# Patient Record
Sex: MALE | Race: ASIAN | NOT HISPANIC OR LATINO | URBAN - METROPOLITAN AREA
[De-identification: names, ages, dates, MRNs, and addresses within clinical notes are randomized per-mention and may not be internally consistent; named-entity substitution may affect disease eponyms.]

---

## 2017-02-05 ENCOUNTER — INPATIENT (INPATIENT)
Facility: HOSPITAL | Age: 62
LOS: 2 days | Discharge: HOME | End: 2017-02-08
Attending: INTERNAL MEDICINE | Admitting: INTERNAL MEDICINE
Payer: COMMERCIAL

## 2017-02-05 VITALS
RESPIRATION RATE: 16 BRPM | SYSTOLIC BLOOD PRESSURE: 124 MMHG | OXYGEN SATURATION: 97 % | DIASTOLIC BLOOD PRESSURE: 80 MMHG | TEMPERATURE: 98 F | HEART RATE: 109 BPM

## 2017-02-05 DIAGNOSIS — Z41.8 ENCOUNTER FOR OTHER PROCEDURES FOR PURPOSES OTHER THAN REMEDYING HEALTH STATE: ICD-10-CM

## 2017-02-05 DIAGNOSIS — R55 SYNCOPE AND COLLAPSE: ICD-10-CM

## 2017-02-05 LAB
ALBUMIN SERPL ELPH-MCNC: 4.4 G/DL — SIGNIFICANT CHANGE UP (ref 3.3–5)
ALP SERPL-CCNC: 74 U/L — SIGNIFICANT CHANGE UP (ref 40–120)
ALT FLD-CCNC: 24 U/L — SIGNIFICANT CHANGE UP (ref 4–41)
APPEARANCE UR: CLEAR — SIGNIFICANT CHANGE UP
AST SERPL-CCNC: 27 U/L — SIGNIFICANT CHANGE UP (ref 4–40)
B PERT DNA SPEC QL NAA+PROBE: SIGNIFICANT CHANGE UP
BACTERIA # UR AUTO: SIGNIFICANT CHANGE UP
BASOPHILS # BLD AUTO: 0.03 K/UL — SIGNIFICANT CHANGE UP (ref 0–0.2)
BASOPHILS NFR BLD AUTO: 0.3 % — SIGNIFICANT CHANGE UP (ref 0–2)
BILIRUB SERPL-MCNC: 0.5 MG/DL — SIGNIFICANT CHANGE UP (ref 0.2–1.2)
BILIRUB UR-MCNC: NEGATIVE — SIGNIFICANT CHANGE UP
BLOOD UR QL VISUAL: NEGATIVE — SIGNIFICANT CHANGE UP
BUN SERPL-MCNC: 19 MG/DL — SIGNIFICANT CHANGE UP (ref 7–23)
C PNEUM DNA SPEC QL NAA+PROBE: NOT DETECTED — SIGNIFICANT CHANGE UP
CALCIUM SERPL-MCNC: 9.7 MG/DL — SIGNIFICANT CHANGE UP (ref 8.4–10.5)
CHLORIDE SERPL-SCNC: 98 MMOL/L — SIGNIFICANT CHANGE UP (ref 98–107)
CK MB BLD-MCNC: 0.8 — SIGNIFICANT CHANGE UP (ref 0–2.5)
CK MB BLD-MCNC: 2.63 NG/ML — SIGNIFICANT CHANGE UP (ref 1–6.6)
CK MB BLD-MCNC: 3.89 NG/ML — SIGNIFICANT CHANGE UP (ref 1–6.6)
CK SERPL-CCNC: 274 U/L — HIGH (ref 30–200)
CK SERPL-CCNC: 497 U/L — HIGH (ref 30–200)
CO2 SERPL-SCNC: 21 MMOL/L — LOW (ref 22–31)
COLOR SPEC: YELLOW — SIGNIFICANT CHANGE UP
CREAT SERPL-MCNC: 1.06 MG/DL — SIGNIFICANT CHANGE UP (ref 0.5–1.3)
EOSINOPHIL # BLD AUTO: 0 K/UL — SIGNIFICANT CHANGE UP (ref 0–0.5)
EOSINOPHIL NFR BLD AUTO: 0 % — SIGNIFICANT CHANGE UP (ref 0–6)
FLUAV H1 2009 PAND RNA SPEC QL NAA+PROBE: NOT DETECTED — SIGNIFICANT CHANGE UP
FLUAV H1 RNA SPEC QL NAA+PROBE: NOT DETECTED — SIGNIFICANT CHANGE UP
FLUAV H3 RNA SPEC QL NAA+PROBE: NOT DETECTED — SIGNIFICANT CHANGE UP
FLUAV SUBTYP SPEC NAA+PROBE: SIGNIFICANT CHANGE UP
FLUBV RNA SPEC QL NAA+PROBE: NOT DETECTED — SIGNIFICANT CHANGE UP
GLUCOSE SERPL-MCNC: 144 MG/DL — HIGH (ref 70–99)
GLUCOSE UR-MCNC: NEGATIVE — SIGNIFICANT CHANGE UP
HADV DNA SPEC QL NAA+PROBE: NOT DETECTED — SIGNIFICANT CHANGE UP
HBA1C BLD-MCNC: 6 % — HIGH (ref 4–5.6)
HCOV 229E RNA SPEC QL NAA+PROBE: NOT DETECTED — SIGNIFICANT CHANGE UP
HCOV HKU1 RNA SPEC QL NAA+PROBE: NOT DETECTED — SIGNIFICANT CHANGE UP
HCOV NL63 RNA SPEC QL NAA+PROBE: NOT DETECTED — SIGNIFICANT CHANGE UP
HCOV OC43 RNA SPEC QL NAA+PROBE: NOT DETECTED — SIGNIFICANT CHANGE UP
HCT VFR BLD CALC: 48.1 % — SIGNIFICANT CHANGE UP (ref 39–50)
HGB BLD-MCNC: 16.1 G/DL — SIGNIFICANT CHANGE UP (ref 13–17)
HMPV RNA SPEC QL NAA+PROBE: POSITIVE — HIGH
HPIV1 RNA SPEC QL NAA+PROBE: NOT DETECTED — SIGNIFICANT CHANGE UP
HPIV2 RNA SPEC QL NAA+PROBE: NOT DETECTED — SIGNIFICANT CHANGE UP
HPIV3 RNA SPEC QL NAA+PROBE: NOT DETECTED — SIGNIFICANT CHANGE UP
HPIV4 RNA SPEC QL NAA+PROBE: NOT DETECTED — SIGNIFICANT CHANGE UP
IMM GRANULOCYTES NFR BLD AUTO: 0.4 % — SIGNIFICANT CHANGE UP (ref 0–1.5)
KETONES UR-MCNC: NEGATIVE — SIGNIFICANT CHANGE UP
LEUKOCYTE ESTERASE UR-ACNC: NEGATIVE — SIGNIFICANT CHANGE UP
LIDOCAIN IGE QN: 29.6 U/L — SIGNIFICANT CHANGE UP (ref 7–60)
LYMPHOCYTES # BLD AUTO: 0.84 K/UL — LOW (ref 1–3.3)
LYMPHOCYTES # BLD AUTO: 7.4 % — LOW (ref 13–44)
M PNEUMO DNA SPEC QL NAA+PROBE: NOT DETECTED — SIGNIFICANT CHANGE UP
MCHC RBC-ENTMCNC: 30.6 PG — SIGNIFICANT CHANGE UP (ref 27–34)
MCHC RBC-ENTMCNC: 33.5 % — SIGNIFICANT CHANGE UP (ref 32–36)
MCV RBC AUTO: 91.3 FL — SIGNIFICANT CHANGE UP (ref 80–100)
MONOCYTES # BLD AUTO: 0.44 K/UL — SIGNIFICANT CHANGE UP (ref 0–0.9)
MONOCYTES NFR BLD AUTO: 3.9 % — SIGNIFICANT CHANGE UP (ref 2–14)
MUCOUS THREADS # UR AUTO: SIGNIFICANT CHANGE UP
NEUTROPHILS # BLD AUTO: 10.03 K/UL — HIGH (ref 1.8–7.4)
NEUTROPHILS NFR BLD AUTO: 88 % — HIGH (ref 43–77)
NITRITE UR-MCNC: NEGATIVE — SIGNIFICANT CHANGE UP
PH UR: 6.5 — SIGNIFICANT CHANGE UP (ref 4.6–8)
PLATELET # BLD AUTO: 186 K/UL — SIGNIFICANT CHANGE UP (ref 150–400)
PMV BLD: 10.2 FL — SIGNIFICANT CHANGE UP (ref 7–13)
POTASSIUM SERPL-MCNC: 4.6 MMOL/L — SIGNIFICANT CHANGE UP (ref 3.5–5.3)
POTASSIUM SERPL-SCNC: 4.6 MMOL/L — SIGNIFICANT CHANGE UP (ref 3.5–5.3)
PROT SERPL-MCNC: 8.2 G/DL — SIGNIFICANT CHANGE UP (ref 6–8.3)
PROT UR-MCNC: 20 — SIGNIFICANT CHANGE UP
RBC # BLD: 5.27 M/UL — SIGNIFICANT CHANGE UP (ref 4.2–5.8)
RBC # FLD: 13.4 % — SIGNIFICANT CHANGE UP (ref 10.3–14.5)
RBC CASTS # UR COMP ASSIST: SIGNIFICANT CHANGE UP (ref 0–?)
RSV RNA SPEC QL NAA+PROBE: NOT DETECTED — SIGNIFICANT CHANGE UP
RV+EV RNA SPEC QL NAA+PROBE: NOT DETECTED — SIGNIFICANT CHANGE UP
SODIUM SERPL-SCNC: 134 MMOL/L — LOW (ref 135–145)
SP GR SPEC: 1.02 — SIGNIFICANT CHANGE UP (ref 1–1.03)
SQUAMOUS # UR AUTO: SIGNIFICANT CHANGE UP
TROPONIN T SERPL-MCNC: < 0.06 NG/ML — SIGNIFICANT CHANGE UP (ref 0–0.06)
TROPONIN T SERPL-MCNC: < 0.06 NG/ML — SIGNIFICANT CHANGE UP (ref 0–0.06)
TSH SERPL-MCNC: 1.25 UIU/ML — SIGNIFICANT CHANGE UP (ref 0.27–4.2)
UROBILINOGEN FLD QL: NORMAL E.U. — SIGNIFICANT CHANGE UP (ref 0.1–0.2)
WBC # BLD: 11.39 K/UL — HIGH (ref 3.8–10.5)
WBC # FLD AUTO: 11.39 K/UL — HIGH (ref 3.8–10.5)
WBC UR QL: SIGNIFICANT CHANGE UP (ref 0–?)

## 2017-02-05 PROCEDURE — 70450 CT HEAD/BRAIN W/O DYE: CPT | Mod: 26

## 2017-02-05 PROCEDURE — 71020: CPT | Mod: 26

## 2017-02-05 RX ORDER — ASPIRIN/CALCIUM CARB/MAGNESIUM 324 MG
81 TABLET ORAL DAILY
Qty: 0 | Refills: 0 | Status: DISCONTINUED | OUTPATIENT
Start: 2017-02-06 | End: 2017-02-08

## 2017-02-05 RX ORDER — ACETAMINOPHEN 500 MG
975 TABLET ORAL ONCE
Qty: 0 | Refills: 0 | Status: COMPLETED | OUTPATIENT
Start: 2017-02-05 | End: 2017-02-05

## 2017-02-05 RX ORDER — ONDANSETRON 8 MG/1
4 TABLET, FILM COATED ORAL ONCE
Qty: 0 | Refills: 0 | Status: COMPLETED | OUTPATIENT
Start: 2017-02-05 | End: 2017-02-05

## 2017-02-05 RX ORDER — TRAMADOL HYDROCHLORIDE 50 MG/1
50 TABLET ORAL ONCE
Qty: 0 | Refills: 0 | Status: DISCONTINUED | OUTPATIENT
Start: 2017-02-05 | End: 2017-02-05

## 2017-02-05 RX ORDER — SODIUM CHLORIDE 9 MG/ML
2000 INJECTION INTRAMUSCULAR; INTRAVENOUS; SUBCUTANEOUS ONCE
Qty: 0 | Refills: 0 | Status: COMPLETED | OUTPATIENT
Start: 2017-02-05 | End: 2017-02-05

## 2017-02-05 RX ORDER — ASPIRIN/CALCIUM CARB/MAGNESIUM 324 MG
162 TABLET ORAL ONCE
Qty: 0 | Refills: 0 | Status: COMPLETED | OUTPATIENT
Start: 2017-02-05 | End: 2017-02-05

## 2017-02-05 RX ORDER — SODIUM CHLORIDE 9 MG/ML
1000 INJECTION INTRAMUSCULAR; INTRAVENOUS; SUBCUTANEOUS
Qty: 0 | Refills: 0 | Status: COMPLETED | OUTPATIENT
Start: 2017-02-05 | End: 2017-02-06

## 2017-02-05 RX ADMIN — TRAMADOL HYDROCHLORIDE 50 MILLIGRAM(S): 50 TABLET ORAL at 22:34

## 2017-02-05 RX ADMIN — Medication 975 MILLIGRAM(S): at 11:51

## 2017-02-05 RX ADMIN — SODIUM CHLORIDE 75 MILLILITER(S): 9 INJECTION INTRAMUSCULAR; INTRAVENOUS; SUBCUTANEOUS at 17:05

## 2017-02-05 RX ADMIN — ONDANSETRON 4 MILLIGRAM(S): 8 TABLET, FILM COATED ORAL at 11:54

## 2017-02-05 RX ADMIN — TRAMADOL HYDROCHLORIDE 50 MILLIGRAM(S): 50 TABLET ORAL at 23:00

## 2017-02-05 RX ADMIN — Medication 162 MILLIGRAM(S): at 13:33

## 2017-02-05 RX ADMIN — SODIUM CHLORIDE 2000 MILLILITER(S): 9 INJECTION INTRAMUSCULAR; INTRAVENOUS; SUBCUTANEOUS at 11:55

## 2017-02-05 NOTE — ED PROVIDER NOTE - OBJECTIVE STATEMENT
61M no pmh p/w loc. for past wk pt has had cough, rhinorrhea & self started himself on amoxicillin yesterday. today while at [place] pt had unwitnessed loc w/ fall to ground & defecated on himself. pt was lightheaded after but still took a flight back to [facility]. pt had similar episode of syncope while in plane & came directly to ed from airport. currently c/o of lightheadedness, n, R sided cp, ha. denies any f/c, abdominal pian, sob. 61M no pmh p/w loc. for past wk pt has had cough, rhinorrhea & self started himself on amoxicillin yesterday. today while at [place] pt had unwitnessed loc w/ fall to ground & defecated on himself w/ no prodromal symptoms. pt was lightheaded after but still took a flight back to [facility]. in airplane pt felt nauseas & lightheaded as walking to bathroom & had similar episode of syncope while in bathroom of airplane. pt came directly to ed from airport. currently c/o of lightheadedness, n, R sided cp, ha. denies any f/c, abdominal pian, sob.

## 2017-02-05 NOTE — ED ADULT TRIAGE NOTE - CHIEF COMPLAINT QUOTE
Pt  s/p syncope lasting 3 to 4 mins  followed by N/V and .  Denies chest pain, dizziness.  Pt stating he had fever and cough yesterday

## 2017-02-05 NOTE — H&P ADULT. - ASSESSMENT
62 yo male p/w chills, headache, rhinorrhea, abdominal discomfort, nausea, vomitting and syncopes admitted to tele for syncope, r/o ACS.

## 2017-02-05 NOTE — ED PROVIDER NOTE - ATTENDING CONTRIBUTION TO CARE
62 yo male no pmh presents with syncope with vomiting and stool incontinence now with cp // NL vitals afebrile no distress ao3 rrr s1s2 cta bl abd sntnd no cva or calf tenderness//ekg labs cxr ua and admit to tele

## 2017-02-05 NOTE — H&P ADULT. - RS GEN PE MLT RESP DETAILS PC
airway patent/no chest wall tenderness/clear to auscultation bilaterally/breath sounds equal/good air movement/respirations non-labored

## 2017-02-05 NOTE — H&P ADULT. - HISTORY OF PRESENT ILLNESS
60 yo male with NO PMHx/PSHx p/w syncopal episodes x2  yesterday. Pt was brushing teeth in the morning yesterday, felt diffuse abdominal discomfort and passed out (unwitnessed). Upon regain of LOC pt found himself on the floor with his fecal matter. Pt started to wash up, became nauseous and vomitted x1 yellow color emesis. Pt went inside the airplane to return back to [place] and passed out again on the airplane. Pt admits to chills, productive cough with white phlegm, rhinorrhea, headache and sore throat. He denies chest pain, sob, or palpitations.     In E.D. pt received 2Liters of NS, tylenol and zofran. Pt asymptomatic currently.

## 2017-02-05 NOTE — ED PROVIDER NOTE - MEDICAL DECISION MAKING DETAILS
ddx: seizure vs dehydration vs cardiogenic syncope.  will r/o acs, ich & arrythmia & rib fx.   -cth -cxr -labs -ce -ivf -zofran -analgesia -ekg -consider admission ddx: seizure vs dehydration vs cardiogenic syncope.  will r/o acs, ich & arrythmia & rib fx.   -cthead -cxr -labs -ce -ivf -zofran -analgesia -ekg -consider admission

## 2017-02-06 LAB
BUN SERPL-MCNC: 14 MG/DL — SIGNIFICANT CHANGE UP (ref 7–23)
CALCIUM SERPL-MCNC: 8.8 MG/DL — SIGNIFICANT CHANGE UP (ref 8.4–10.5)
CHLORIDE SERPL-SCNC: 100 MMOL/L — SIGNIFICANT CHANGE UP (ref 98–107)
CHOLEST SERPL-MCNC: 159 MG/DL — SIGNIFICANT CHANGE UP (ref 120–199)
CK MB BLD-MCNC: 1.97 NG/ML — SIGNIFICANT CHANGE UP (ref 1–6.6)
CK SERPL-CCNC: 497 U/L — HIGH (ref 30–200)
CO2 SERPL-SCNC: 19 MMOL/L — LOW (ref 22–31)
CREAT SERPL-MCNC: 0.97 MG/DL — SIGNIFICANT CHANGE UP (ref 0.5–1.3)
GLUCOSE SERPL-MCNC: 95 MG/DL — SIGNIFICANT CHANGE UP (ref 70–99)
HCT VFR BLD CALC: 47.5 % — SIGNIFICANT CHANGE UP (ref 39–50)
HDLC SERPL-MCNC: 36 MG/DL — SIGNIFICANT CHANGE UP (ref 35–55)
HGB BLD-MCNC: 15.3 G/DL — SIGNIFICANT CHANGE UP (ref 13–17)
LIPID PNL WITH DIRECT LDL SERPL: 104 MG/DL — SIGNIFICANT CHANGE UP
MAGNESIUM SERPL-MCNC: 2.1 MG/DL — SIGNIFICANT CHANGE UP (ref 1.6–2.6)
MCHC RBC-ENTMCNC: 29.8 PG — SIGNIFICANT CHANGE UP (ref 27–34)
MCHC RBC-ENTMCNC: 32.2 % — SIGNIFICANT CHANGE UP (ref 32–36)
MCV RBC AUTO: 92.6 FL — SIGNIFICANT CHANGE UP (ref 80–100)
PLATELET # BLD AUTO: 189 K/UL — SIGNIFICANT CHANGE UP (ref 150–400)
PMV BLD: 10.1 FL — SIGNIFICANT CHANGE UP (ref 7–13)
POTASSIUM SERPL-MCNC: 4.6 MMOL/L — SIGNIFICANT CHANGE UP (ref 3.5–5.3)
POTASSIUM SERPL-SCNC: 4.6 MMOL/L — SIGNIFICANT CHANGE UP (ref 3.5–5.3)
RBC # BLD: 5.13 M/UL — SIGNIFICANT CHANGE UP (ref 4.2–5.8)
RBC # FLD: 13.6 % — SIGNIFICANT CHANGE UP (ref 10.3–14.5)
SODIUM SERPL-SCNC: 136 MMOL/L — SIGNIFICANT CHANGE UP (ref 135–145)
TRIGL SERPL-MCNC: 91 MG/DL — SIGNIFICANT CHANGE UP (ref 10–149)
TROPONIN T SERPL-MCNC: < 0.06 NG/ML — SIGNIFICANT CHANGE UP (ref 0–0.06)
WBC # BLD: 8.91 K/UL — SIGNIFICANT CHANGE UP (ref 3.8–10.5)
WBC # FLD AUTO: 8.91 K/UL — SIGNIFICANT CHANGE UP (ref 3.8–10.5)

## 2017-02-06 RX ORDER — ACETAMINOPHEN 500 MG
650 TABLET ORAL EVERY 6 HOURS
Qty: 0 | Refills: 0 | Status: DISCONTINUED | OUTPATIENT
Start: 2017-02-06 | End: 2017-02-08

## 2017-02-06 RX ORDER — TRAMADOL HYDROCHLORIDE 50 MG/1
50 TABLET ORAL THREE TIMES A DAY
Qty: 0 | Refills: 0 | Status: DISCONTINUED | OUTPATIENT
Start: 2017-02-06 | End: 2017-02-08

## 2017-02-06 RX ORDER — ACETAMINOPHEN 500 MG
650 TABLET ORAL EVERY 6 HOURS
Qty: 0 | Refills: 0 | Status: DISCONTINUED | OUTPATIENT
Start: 2017-02-05 | End: 2017-02-08

## 2017-02-06 RX ADMIN — TRAMADOL HYDROCHLORIDE 50 MILLIGRAM(S): 50 TABLET ORAL at 21:36

## 2017-02-06 RX ADMIN — Medication 81 MILLIGRAM(S): at 11:28

## 2017-02-06 RX ADMIN — SODIUM CHLORIDE 75 MILLILITER(S): 9 INJECTION INTRAMUSCULAR; INTRAVENOUS; SUBCUTANEOUS at 17:06

## 2017-02-06 RX ADMIN — TRAMADOL HYDROCHLORIDE 50 MILLIGRAM(S): 50 TABLET ORAL at 22:30

## 2017-02-06 RX ADMIN — Medication 650 MILLIGRAM(S): at 00:22

## 2017-02-06 RX ADMIN — Medication 200 MILLIGRAM(S): at 21:36

## 2017-02-07 LAB
BASOPHILS # BLD AUTO: 0.06 K/UL — SIGNIFICANT CHANGE UP (ref 0–0.2)
BASOPHILS NFR BLD AUTO: 0.5 % — SIGNIFICANT CHANGE UP (ref 0–2)
BUN SERPL-MCNC: 17 MG/DL — SIGNIFICANT CHANGE UP (ref 7–23)
CALCIUM SERPL-MCNC: 8.2 MG/DL — LOW (ref 8.4–10.5)
CHLORIDE SERPL-SCNC: 101 MMOL/L — SIGNIFICANT CHANGE UP (ref 98–107)
CO2 SERPL-SCNC: 20 MMOL/L — LOW (ref 22–31)
CREAT SERPL-MCNC: 0.84 MG/DL — SIGNIFICANT CHANGE UP (ref 0.5–1.3)
EOSINOPHIL # BLD AUTO: 0.08 K/UL — SIGNIFICANT CHANGE UP (ref 0–0.5)
EOSINOPHIL NFR BLD AUTO: 0.7 % — SIGNIFICANT CHANGE UP (ref 0–6)
GLUCOSE SERPL-MCNC: 129 MG/DL — HIGH (ref 70–99)
HCT VFR BLD CALC: 42.2 % — SIGNIFICANT CHANGE UP (ref 39–50)
HGB BLD-MCNC: 13.7 G/DL — SIGNIFICANT CHANGE UP (ref 13–17)
IMM GRANULOCYTES NFR BLD AUTO: 0.1 % — SIGNIFICANT CHANGE UP (ref 0–1.5)
LYMPHOCYTES # BLD AUTO: 1.37 K/UL — SIGNIFICANT CHANGE UP (ref 1–3.3)
LYMPHOCYTES # BLD AUTO: 12.5 % — LOW (ref 13–44)
MAGNESIUM SERPL-MCNC: 2 MG/DL — SIGNIFICANT CHANGE UP (ref 1.6–2.6)
MANUAL SMEAR VERIFICATION: SIGNIFICANT CHANGE UP
MCHC RBC-ENTMCNC: 29.8 PG — SIGNIFICANT CHANGE UP (ref 27–34)
MCHC RBC-ENTMCNC: 32.5 % — SIGNIFICANT CHANGE UP (ref 32–36)
MCV RBC AUTO: 91.7 FL — SIGNIFICANT CHANGE UP (ref 80–100)
MONOCYTES # BLD AUTO: 0.78 K/UL — SIGNIFICANT CHANGE UP (ref 0–0.9)
MONOCYTES NFR BLD AUTO: 7.1 % — SIGNIFICANT CHANGE UP (ref 2–14)
MORPHOLOGY BLD-IMP: SIGNIFICANT CHANGE UP
NEUTROPHILS # BLD AUTO: 8.68 K/UL — HIGH (ref 1.8–7.4)
NEUTROPHILS NFR BLD AUTO: 79.1 % — HIGH (ref 43–77)
PHOSPHATE SERPL-MCNC: 2.7 MG/DL — SIGNIFICANT CHANGE UP (ref 2.5–4.5)
PLATELET # BLD AUTO: 194 K/UL — SIGNIFICANT CHANGE UP (ref 150–400)
PLATELET COUNT - ESTIMATE: NORMAL — SIGNIFICANT CHANGE UP
PMV BLD: 10.3 FL — SIGNIFICANT CHANGE UP (ref 7–13)
POTASSIUM SERPL-MCNC: 4.2 MMOL/L — SIGNIFICANT CHANGE UP (ref 3.5–5.3)
POTASSIUM SERPL-SCNC: 4.2 MMOL/L — SIGNIFICANT CHANGE UP (ref 3.5–5.3)
RBC # BLD: 4.6 M/UL — SIGNIFICANT CHANGE UP (ref 4.2–5.8)
RBC # FLD: 13.6 % — SIGNIFICANT CHANGE UP (ref 10.3–14.5)
SODIUM SERPL-SCNC: 137 MMOL/L — SIGNIFICANT CHANGE UP (ref 135–145)
WBC # BLD: 10.98 K/UL — HIGH (ref 3.8–10.5)
WBC # FLD AUTO: 10.98 K/UL — HIGH (ref 3.8–10.5)

## 2017-02-07 PROCEDURE — 93306 TTE W/DOPPLER COMPLETE: CPT | Mod: 26

## 2017-02-07 PROCEDURE — 71100 X-RAY EXAM RIBS UNI 2 VIEWS: CPT | Mod: 26,LT

## 2017-02-07 RX ORDER — SODIUM CHLORIDE 9 MG/ML
500 INJECTION INTRAMUSCULAR; INTRAVENOUS; SUBCUTANEOUS ONCE
Qty: 0 | Refills: 0 | Status: COMPLETED | OUTPATIENT
Start: 2017-02-07 | End: 2017-02-07

## 2017-02-07 RX ADMIN — Medication 650 MILLIGRAM(S): at 23:47

## 2017-02-07 RX ADMIN — Medication 81 MILLIGRAM(S): at 12:48

## 2017-02-07 RX ADMIN — SODIUM CHLORIDE 1000 MILLILITER(S): 9 INJECTION INTRAMUSCULAR; INTRAVENOUS; SUBCUTANEOUS at 06:27

## 2017-02-08 ENCOUNTER — TRANSCRIPTION ENCOUNTER (OUTPATIENT)
Age: 62
End: 2017-02-08

## 2017-02-08 VITALS
HEART RATE: 80 BPM | TEMPERATURE: 98 F | RESPIRATION RATE: 18 BRPM | SYSTOLIC BLOOD PRESSURE: 132 MMHG | DIASTOLIC BLOOD PRESSURE: 63 MMHG | OXYGEN SATURATION: 98 %

## 2017-02-08 LAB
BASOPHILS # BLD AUTO: 0.08 K/UL — SIGNIFICANT CHANGE UP (ref 0–0.2)
BASOPHILS NFR BLD AUTO: 0.9 % — SIGNIFICANT CHANGE UP (ref 0–2)
BUN SERPL-MCNC: 14 MG/DL — SIGNIFICANT CHANGE UP (ref 7–23)
CALCIUM SERPL-MCNC: 9.2 MG/DL — SIGNIFICANT CHANGE UP (ref 8.4–10.5)
CHLORIDE SERPL-SCNC: 100 MMOL/L — SIGNIFICANT CHANGE UP (ref 98–107)
CO2 SERPL-SCNC: 21 MMOL/L — LOW (ref 22–31)
CREAT SERPL-MCNC: 0.76 MG/DL — SIGNIFICANT CHANGE UP (ref 0.5–1.3)
EOSINOPHIL # BLD AUTO: 0.25 K/UL — SIGNIFICANT CHANGE UP (ref 0–0.5)
EOSINOPHIL NFR BLD AUTO: 2.8 % — SIGNIFICANT CHANGE UP (ref 0–6)
GLUCOSE SERPL-MCNC: 100 MG/DL — HIGH (ref 70–99)
HCT VFR BLD CALC: 46.1 % — SIGNIFICANT CHANGE UP (ref 39–50)
HGB BLD-MCNC: 15.3 G/DL — SIGNIFICANT CHANGE UP (ref 13–17)
IMM GRANULOCYTES NFR BLD AUTO: 0.1 % — SIGNIFICANT CHANGE UP (ref 0–1.5)
LYMPHOCYTES # BLD AUTO: 2.88 K/UL — SIGNIFICANT CHANGE UP (ref 1–3.3)
LYMPHOCYTES # BLD AUTO: 32.4 % — SIGNIFICANT CHANGE UP (ref 13–44)
MAGNESIUM SERPL-MCNC: 2.4 MG/DL — SIGNIFICANT CHANGE UP (ref 1.6–2.6)
MCHC RBC-ENTMCNC: 30 PG — SIGNIFICANT CHANGE UP (ref 27–34)
MCHC RBC-ENTMCNC: 33.2 % — SIGNIFICANT CHANGE UP (ref 32–36)
MCV RBC AUTO: 90.4 FL — SIGNIFICANT CHANGE UP (ref 80–100)
MONOCYTES # BLD AUTO: 0.84 K/UL — SIGNIFICANT CHANGE UP (ref 0–0.9)
MONOCYTES NFR BLD AUTO: 9.4 % — SIGNIFICANT CHANGE UP (ref 2–14)
NEUTROPHILS # BLD AUTO: 4.84 K/UL — SIGNIFICANT CHANGE UP (ref 1.8–7.4)
NEUTROPHILS NFR BLD AUTO: 54.4 % — SIGNIFICANT CHANGE UP (ref 43–77)
PLATELET # BLD AUTO: 213 K/UL — SIGNIFICANT CHANGE UP (ref 150–400)
PMV BLD: 10.2 FL — SIGNIFICANT CHANGE UP (ref 7–13)
POTASSIUM SERPL-MCNC: 4.2 MMOL/L — SIGNIFICANT CHANGE UP (ref 3.5–5.3)
POTASSIUM SERPL-SCNC: 4.2 MMOL/L — SIGNIFICANT CHANGE UP (ref 3.5–5.3)
RBC # BLD: 5.1 M/UL — SIGNIFICANT CHANGE UP (ref 4.2–5.8)
RBC # FLD: 13.3 % — SIGNIFICANT CHANGE UP (ref 10.3–14.5)
SODIUM SERPL-SCNC: 136 MMOL/L — SIGNIFICANT CHANGE UP (ref 135–145)
WBC # BLD: 8.9 K/UL — SIGNIFICANT CHANGE UP (ref 3.8–10.5)
WBC # FLD AUTO: 8.9 K/UL — SIGNIFICANT CHANGE UP (ref 3.8–10.5)

## 2017-02-08 RX ORDER — ASPIRIN/CALCIUM CARB/MAGNESIUM 324 MG
1 TABLET ORAL
Qty: 0 | Refills: 0 | COMMUNITY
Start: 2017-02-08

## 2017-02-08 RX ORDER — ACETAMINOPHEN 500 MG
2 TABLET ORAL
Qty: 0 | Refills: 0 | COMMUNITY
Start: 2017-02-08

## 2017-02-08 RX ADMIN — Medication 650 MILLIGRAM(S): at 00:36

## 2017-02-08 RX ADMIN — Medication 81 MILLIGRAM(S): at 13:07

## 2017-02-08 NOTE — DISCHARGE NOTE ADULT - CARE PROVIDER_API CALL
Reva Proctor), Cardiovascular Disease; Internal Medicine  [facility] [address] [place] [address]  Phone: [phone]  Fax: [phone] Rocio Belle), Cardiovascular Disease; Internal Medicine  [address] [place] [address]  Phone: [phone]  Fax: [phone]

## 2017-02-08 NOTE — DISCHARGE NOTE ADULT - CARE PLAN
Principal Discharge DX:	Syncope and collapse  Goal:	Stay well hydrated. Follow up as directed.  Instructions for follow-up, activity and diet:	Follow up with your primary care physician for further monitoring in 1-2 weeks. Please call to arrange appointment. Follow up with your cardiologist for an event monitor as outpatient and possible tilt table test.  Secondary Diagnosis:	Orthostatic hypotension  Goal:	Stay well hydrated to prevent recurrent drop in blood pressure upon standing. Arise very slowly from lying to sitting and sitting tp standing.  Instructions for follow-up, activity and diet:	Follow up with your primary care physician for further monitoring in 1-2 weeks. Please call to arrange appointment.  Secondary Diagnosis:	Viral infection  Goal:	Continue supportive care. Stay well hydrated.  Instructions for follow-up, activity and diet:	Follow up with your primary care physician for further monitoring in 1-2 weeks. Please call to arrange appointment. Principal Discharge DX:	Syncope and collapse  Goal:	Stay well hydrated. Follow up as directed.  Instructions for follow-up, activity and diet:	Follow up with your primary care physician for further monitoring in 1-2 weeks. Please call to arrange appointment. Follow up with your cardiologist  Dr. Proctor on 2/17 @ 2:30pm for an event monitor as outpatient and possible tilt table test.  Secondary Diagnosis:	Orthostatic hypotension  Goal:	Stay well hydrated to prevent recurrent drop in blood pressure upon standing. Arise very slowly from lying to sitting and sitting tp standing.  Instructions for follow-up, activity and diet:	Follow up with your primary care physician for further monitoring in 1-2 weeks. Please call to arrange appointment.  Secondary Diagnosis:	Viral infection  Goal:	Continue supportive care. Stay well hydrated.  Instructions for follow-up, activity and diet:	Follow up with your primary care physician for further monitoring in 1-2 weeks. Please call to arrange appointment. Principal Discharge DX:	Syncope and collapse  Goal:	Stay well hydrated. Follow up as directed.  Instructions for follow-up, activity and diet:	Follow up with your primary care physician for further monitoring in 1-2 weeks. Please call to arrange appointment. Follow up with your cardiologist  Dr. Garnett for an event monitor as outpatient and possible tilt table test.  Secondary Diagnosis:	Orthostatic hypotension  Goal:	Stay well hydrated to prevent recurrent drop in blood pressure upon standing. Arise very slowly from lying to sitting and sitting tp standing.  Instructions for follow-up, activity and diet:	Follow up with your primary care physician for further monitoring in 1-2 weeks. Please call to arrange appointment.  Secondary Diagnosis:	Viral infection  Goal:	Continue supportive care. Stay well hydrated.  Instructions for follow-up, activity and diet:	Follow up with your primary care physician for further monitoring in 1-2 weeks. Please call to arrange appointment.

## 2017-02-08 NOTE — DISCHARGE NOTE ADULT - HOSPITAL COURSE
60 y/o male with no significant PMHx presents to ED with chills, headache, rhinorrhea, abdominal discomfort, nausea, vomiting and syncope, admitted to tele for syncope.    + Syncope- cards (Barber) and EP (Marko) following, pending echo, likely vagal  + Orthostatic positive- S/P 2L NS in ED, now on NS at 75 cc/hr, repeat negative  + hMPV positive- on contact isolations, supportive care, Tylenol prn    On admission: EKG: Sinus tachycardia at 104 bpm. ACS ruled out by negative cardiac enzymes. CT head: No intracranial hemorrhage, mass effect or CT evidence of acute territorial infarct. If the patient has new and persistent symptoms, consider short interval follow-up head CT or brain MRI follow-up. WBC: 11.39. Na: 134. RVP: +hMPV positive. HgbA1C 6%. CXR: clear lungs.    Cardiology, Dr. Blandon: Echo pending. TSH pending. Follow up primary team for workup of abdominal pain. Tele. EP consult. EP consult, Dr. Zhu: Recurrent unexplained syncope likely vagal. Get echo. If echo normal, recommend outpatient event monitor. Workup for abdominal pain as per primary team. Tilt table test after echo prior to discharge to confirm my suspicion of vagal syncope. L Rib XR: No acute appearing displaced rib fractures or other gross rib pathology. Intact remaining imaged osseous structures. Clear visualized lungs. No pleural effusion and no pneumothorax. Pt found to be orthostatic positive likely due to dehydration in setting of hMPV. Pt was hydrated with normal saline and orthostasis improved.     Echocardiogram displayed EF 66% Mitral annular calcification, otherwise normal mitral valve. Minimal mitral regurgitation. Normal left ventricular internal dimensions and wall thicknesses. Normal left ventricular systolic function. No segmental wall motion abnormalities. Normal right ventricular size and function.     EP: if echo and orthostatics normal recommend tilt table test prior to discharge to confirm suspected diagnosed of vagal syncope. EP follow up on 2/8, recommended outpatient event monitor and tilt table test once he's off isolation. Per Dr. Zhu, can be done as outpt once off contact isolation.     Pt was cleared by cardiology and EP for discharge to home. Case discussed with Dr. Rawls, Pt medically cleared for discharge to home with follow up noted above.

## 2017-02-08 NOTE — DISCHARGE NOTE ADULT - PLAN OF CARE
Stay well hydrated to prevent recurrent drop in blood pressure upon standing. Arise very slowly from lying to sitting and sitting tp standing. Follow up with your primary care physician for further monitoring in 1-2 weeks. Please call to arrange appointment. Follow up with your cardiologist for an event monitor as outpatient and possible tilt table test. Follow up with your primary care physician for further monitoring in 1-2 weeks. Please call to arrange appointment. Continue supportive care. Stay well hydrated. Stay well hydrated. Follow up as directed. Follow up with your primary care physician for further monitoring in 1-2 weeks. Please call to arrange appointment. Follow up with your cardiologist  Dr. Proctor on 2/17 @ 2:30pm for an event monitor as outpatient and possible tilt table test. Follow up with your primary care physician for further monitoring in 1-2 weeks. Please call to arrange appointment. Follow up with your cardiologist  Dr. Garnett for an event monitor as outpatient and possible tilt table test.

## 2017-02-08 NOTE — DISCHARGE NOTE ADULT - MEDICATION SUMMARY - MEDICATIONS TO TAKE
I will START or STAY ON the medications listed below when I get home from the hospital:    acetaminophen 325 mg oral tablet  -- 2 tab(s) by mouth every 6 hours, As needed, for Pain  -- Indication: For Pain control    aspirin 81 mg oral delayed release tablet  -- 1 tab(s) by mouth once a day  -- Indication: For Heart Health    guaiFENesin 100 mg/5 mL oral liquid  -- 10 milliliter(s) by mouth every 6 hours, As needed, Cough  -- Indication: For Viral infection

## 2017-02-16 ENCOUNTER — TRANSCRIPTION ENCOUNTER (OUTPATIENT)
Age: 62
End: 2017-02-16

## 2018-03-08 ENCOUNTER — EMERGENCY (EMERGENCY)
Facility: HOSPITAL | Age: 63
LOS: 1 days | Discharge: HOME | End: 2018-03-08
Attending: EMERGENCY MEDICINE | Admitting: EMERGENCY MEDICINE
Payer: COMMERCIAL

## 2018-03-08 VITALS
HEART RATE: 78 BPM | SYSTOLIC BLOOD PRESSURE: 154 MMHG | TEMPERATURE: 98 F | RESPIRATION RATE: 16 BRPM | DIASTOLIC BLOOD PRESSURE: 86 MMHG | OXYGEN SATURATION: 99 %

## 2018-03-08 VITALS
HEIGHT: 64 IN | RESPIRATION RATE: 16 BRPM | DIASTOLIC BLOOD PRESSURE: 89 MMHG | HEART RATE: 71 BPM | SYSTOLIC BLOOD PRESSURE: 146 MMHG | TEMPERATURE: 98 F | OXYGEN SATURATION: 98 % | WEIGHT: 110.23 LBS

## 2018-03-08 PROCEDURE — 99283 EMERGENCY DEPT VISIT LOW MDM: CPT

## 2018-03-08 RX ORDER — DIPHENHYDRAMINE HCL 50 MG
25 CAPSULE ORAL ONCE
Qty: 0 | Refills: 0 | Status: COMPLETED | OUTPATIENT
Start: 2018-03-08 | End: 2018-03-08

## 2018-03-08 RX ADMIN — Medication 25 MILLIGRAM(S): at 22:40

## 2018-03-08 RX ADMIN — Medication 60 MILLIGRAM(S): at 22:40

## 2018-03-08 NOTE — ED ADULT TRIAGE NOTE - CHIEF COMPLAINT QUOTE
" I have rash on the right side cheek  since yesterday . Tonight I also have the rash on my (Right) arm "

## 2018-03-08 NOTE — ED PROVIDER NOTE - OBJECTIVE STATEMENT
63 y/o M pt w/ no significant PMHx presents to the ED c/o rash since yesterday. Pt states that he noticed a rash develop on his R cheek yesterday, went to work like normal, a couple hours after he noticed the redness it started to become pruritic. States that today he noticed that the rash spread to his R axillary region. States he works at a [employer] and may have been exposed to something. Pt denies n/v, fever, cough, sob or any other complaints at this time.

## 2018-03-08 NOTE — ED PROVIDER NOTE - MEDICAL DECISION MAKING DETAILS
pt with hives to the right side of his face and arm with possible contact exposure at work.  benadryl/prednisone.  Otherwise hemodynamically stable. no signs of anaphylaxis

## 2020-05-13 ENCOUNTER — TRANSCRIPTION ENCOUNTER (OUTPATIENT)
Age: 65
End: 2020-05-13

## 2020-09-08 ENCOUNTER — TRANSCRIPTION ENCOUNTER (OUTPATIENT)
Age: 65
End: 2020-09-08

## 2021-02-11 ENCOUNTER — INPATIENT (INPATIENT)
Facility: HOSPITAL | Age: 66
LOS: 1 days | Discharge: HOME | End: 2021-02-13
Attending: SURGERY | Admitting: SURGERY
Payer: COMMERCIAL

## 2021-02-11 ENCOUNTER — EMERGENCY (EMERGENCY)
Facility: HOSPITAL | Age: 66
LOS: 1 days | Discharge: ANOTHER HOSPITAL | End: 2021-02-11
Attending: STUDENT IN AN ORGANIZED HEALTH CARE EDUCATION/TRAINING PROGRAM | Admitting: STUDENT IN AN ORGANIZED HEALTH CARE EDUCATION/TRAINING PROGRAM
Payer: COMMERCIAL

## 2021-02-11 VITALS
HEART RATE: 87 BPM | RESPIRATION RATE: 18 BRPM | TEMPERATURE: 98 F | DIASTOLIC BLOOD PRESSURE: 81 MMHG | OXYGEN SATURATION: 100 % | SYSTOLIC BLOOD PRESSURE: 144 MMHG

## 2021-02-11 VITALS
SYSTOLIC BLOOD PRESSURE: 150 MMHG | RESPIRATION RATE: 18 BRPM | HEART RATE: 88 BPM | OXYGEN SATURATION: 97 % | DIASTOLIC BLOOD PRESSURE: 94 MMHG

## 2021-02-11 VITALS
HEART RATE: 70 BPM | RESPIRATION RATE: 20 BRPM | DIASTOLIC BLOOD PRESSURE: 72 MMHG | TEMPERATURE: 98 F | HEIGHT: 64 IN | WEIGHT: 132.06 LBS | OXYGEN SATURATION: 98 % | SYSTOLIC BLOOD PRESSURE: 130 MMHG

## 2021-02-11 DIAGNOSIS — K86.9 DISEASE OF PANCREAS, UNSPECIFIED: ICD-10-CM

## 2021-02-11 LAB
ALBUMIN SERPL ELPH-MCNC: 3.5 G/DL — SIGNIFICANT CHANGE UP (ref 3.3–5)
ALP SERPL-CCNC: 71 U/L — SIGNIFICANT CHANGE UP (ref 30–120)
ALT FLD-CCNC: 31 U/L DA — SIGNIFICANT CHANGE UP (ref 10–60)
AMYLASE P1 CFR SERPL: 35 U/L — SIGNIFICANT CHANGE UP (ref 25–125)
ANION GAP SERPL CALC-SCNC: 12 MMOL/L — SIGNIFICANT CHANGE UP (ref 5–17)
AST SERPL-CCNC: 27 U/L — SIGNIFICANT CHANGE UP (ref 10–40)
BASOPHILS # BLD AUTO: 0.06 K/UL — SIGNIFICANT CHANGE UP (ref 0–0.2)
BASOPHILS NFR BLD AUTO: 0.6 % — SIGNIFICANT CHANGE UP (ref 0–2)
BILIRUB SERPL-MCNC: 0.3 MG/DL — SIGNIFICANT CHANGE UP (ref 0.2–1.2)
BUN SERPL-MCNC: 23 MG/DL — SIGNIFICANT CHANGE UP (ref 7–23)
CALCIUM SERPL-MCNC: 9.3 MG/DL — SIGNIFICANT CHANGE UP (ref 8.4–10.5)
CHLORIDE SERPL-SCNC: 100 MMOL/L — SIGNIFICANT CHANGE UP (ref 96–108)
CK MB BLD-MCNC: 1.2 % — SIGNIFICANT CHANGE UP (ref 0–3.5)
CK MB CFR SERPL CALC: 2.5 NG/ML — SIGNIFICANT CHANGE UP (ref 0–3.6)
CK SERPL-CCNC: 213 U/L — SIGNIFICANT CHANGE UP (ref 39–308)
CO2 SERPL-SCNC: 23 MMOL/L — SIGNIFICANT CHANGE UP (ref 22–31)
CREAT SERPL-MCNC: 0.94 MG/DL — SIGNIFICANT CHANGE UP (ref 0.5–1.3)
EOSINOPHIL # BLD AUTO: 0.22 K/UL — SIGNIFICANT CHANGE UP (ref 0–0.5)
EOSINOPHIL NFR BLD AUTO: 2.2 % — SIGNIFICANT CHANGE UP (ref 0–6)
GLUCOSE BLDC GLUCOMTR-MCNC: 208 MG/DL — HIGH (ref 70–99)
GLUCOSE BLDC GLUCOMTR-MCNC: 75 MG/DL — SIGNIFICANT CHANGE UP (ref 70–99)
GLUCOSE SERPL-MCNC: 151 MG/DL — HIGH (ref 70–99)
HCT VFR BLD CALC: 40 % — SIGNIFICANT CHANGE UP (ref 39–50)
HGB BLD-MCNC: 12.7 G/DL — LOW (ref 13–17)
IMM GRANULOCYTES NFR BLD AUTO: 0.2 % — SIGNIFICANT CHANGE UP (ref 0–1.5)
LIDOCAIN IGE QN: 39 U/L — LOW (ref 73–393)
LYMPHOCYTES # BLD AUTO: 3.52 K/UL — HIGH (ref 1–3.3)
LYMPHOCYTES # BLD AUTO: 35.8 % — SIGNIFICANT CHANGE UP (ref 13–44)
MCHC RBC-ENTMCNC: 27.9 PG — SIGNIFICANT CHANGE UP (ref 27–34)
MCHC RBC-ENTMCNC: 31.8 GM/DL — LOW (ref 32–36)
MCV RBC AUTO: 87.7 FL — SIGNIFICANT CHANGE UP (ref 80–100)
MONOCYTES # BLD AUTO: 0.76 K/UL — SIGNIFICANT CHANGE UP (ref 0–0.9)
MONOCYTES NFR BLD AUTO: 7.7 % — SIGNIFICANT CHANGE UP (ref 2–14)
NEUTROPHILS # BLD AUTO: 5.25 K/UL — SIGNIFICANT CHANGE UP (ref 1.8–7.4)
NEUTROPHILS NFR BLD AUTO: 53.5 % — SIGNIFICANT CHANGE UP (ref 43–77)
NRBC # BLD: 0 /100 WBCS — SIGNIFICANT CHANGE UP (ref 0–0)
PLATELET # BLD AUTO: 249 K/UL — SIGNIFICANT CHANGE UP (ref 150–400)
POTASSIUM SERPL-MCNC: 4 MMOL/L — SIGNIFICANT CHANGE UP (ref 3.5–5.3)
POTASSIUM SERPL-SCNC: 4 MMOL/L — SIGNIFICANT CHANGE UP (ref 3.5–5.3)
PROT SERPL-MCNC: 6.9 G/DL — SIGNIFICANT CHANGE UP (ref 6–8.3)
RBC # BLD: 4.56 M/UL — SIGNIFICANT CHANGE UP (ref 4.2–5.8)
RBC # FLD: 13.2 % — SIGNIFICANT CHANGE UP (ref 10.3–14.5)
SARS-COV-2 RNA SPEC QL NAA+PROBE: SIGNIFICANT CHANGE UP
SODIUM SERPL-SCNC: 135 MMOL/L — SIGNIFICANT CHANGE UP (ref 135–145)
TROPONIN I SERPL-MCNC: 0 NG/ML — LOW (ref 0.02–0.06)
WBC # BLD: 9.83 K/UL — SIGNIFICANT CHANGE UP (ref 3.8–10.5)
WBC # FLD AUTO: 9.83 K/UL — SIGNIFICANT CHANGE UP (ref 3.8–10.5)

## 2021-02-11 PROCEDURE — 82150 ASSAY OF AMYLASE: CPT

## 2021-02-11 PROCEDURE — 99285 EMERGENCY DEPT VISIT HI MDM: CPT

## 2021-02-11 PROCEDURE — 82550 ASSAY OF CK (CPK): CPT

## 2021-02-11 PROCEDURE — 71045 X-RAY EXAM CHEST 1 VIEW: CPT | Mod: 26

## 2021-02-11 PROCEDURE — 93010 ELECTROCARDIOGRAM REPORT: CPT

## 2021-02-11 PROCEDURE — 36415 COLL VENOUS BLD VENIPUNCTURE: CPT

## 2021-02-11 PROCEDURE — 83690 ASSAY OF LIPASE: CPT

## 2021-02-11 PROCEDURE — U0005: CPT

## 2021-02-11 PROCEDURE — 99283 EMERGENCY DEPT VISIT LOW MDM: CPT

## 2021-02-11 PROCEDURE — 74177 CT ABD & PELVIS W/CONTRAST: CPT | Mod: 26,MH

## 2021-02-11 PROCEDURE — 71045 X-RAY EXAM CHEST 1 VIEW: CPT

## 2021-02-11 PROCEDURE — 99285 EMERGENCY DEPT VISIT HI MDM: CPT | Mod: 25

## 2021-02-11 PROCEDURE — 93005 ELECTROCARDIOGRAM TRACING: CPT

## 2021-02-11 PROCEDURE — 96360 HYDRATION IV INFUSION INIT: CPT

## 2021-02-11 PROCEDURE — 84484 ASSAY OF TROPONIN QUANT: CPT

## 2021-02-11 PROCEDURE — 74177 CT ABD & PELVIS W/CONTRAST: CPT

## 2021-02-11 PROCEDURE — 82553 CREATINE MB FRACTION: CPT

## 2021-02-11 PROCEDURE — U0003: CPT

## 2021-02-11 PROCEDURE — 80053 COMPREHEN METABOLIC PANEL: CPT

## 2021-02-11 PROCEDURE — 85025 COMPLETE CBC W/AUTO DIFF WBC: CPT

## 2021-02-11 RX ORDER — SODIUM CHLORIDE 9 MG/ML
1000 INJECTION INTRAMUSCULAR; INTRAVENOUS; SUBCUTANEOUS ONCE
Refills: 0 | Status: COMPLETED | OUTPATIENT
Start: 2021-02-11 | End: 2021-02-11

## 2021-02-11 RX ORDER — ACETAMINOPHEN 500 MG
1000 TABLET ORAL EVERY 6 HOURS
Refills: 0 | Status: DISCONTINUED | OUTPATIENT
Start: 2021-02-11 | End: 2021-02-13

## 2021-02-11 RX ORDER — INSULIN LISPRO 100/ML
VIAL (ML) SUBCUTANEOUS EVERY 6 HOURS
Refills: 0 | Status: DISCONTINUED | OUTPATIENT
Start: 2021-02-11 | End: 2021-02-11

## 2021-02-11 RX ORDER — GLUCAGON INJECTION, SOLUTION 0.5 MG/.1ML
1 INJECTION, SOLUTION SUBCUTANEOUS ONCE
Refills: 0 | Status: DISCONTINUED | OUTPATIENT
Start: 2021-02-11 | End: 2021-02-12

## 2021-02-11 RX ORDER — SODIUM CHLORIDE 9 MG/ML
1000 INJECTION, SOLUTION INTRAVENOUS
Refills: 0 | Status: DISCONTINUED | OUTPATIENT
Start: 2021-02-11 | End: 2021-02-12

## 2021-02-11 RX ORDER — DEXTROSE 50 % IN WATER 50 %
15 SYRINGE (ML) INTRAVENOUS ONCE
Refills: 0 | Status: DISCONTINUED | OUTPATIENT
Start: 2021-02-11 | End: 2021-02-12

## 2021-02-11 RX ORDER — LANOLIN ALCOHOL/MO/W.PET/CERES
3 CREAM (GRAM) TOPICAL AT BEDTIME
Refills: 0 | Status: DISCONTINUED | OUTPATIENT
Start: 2021-02-11 | End: 2021-02-13

## 2021-02-11 RX ORDER — INSULIN LISPRO 100/ML
VIAL (ML) SUBCUTANEOUS
Refills: 0 | Status: DISCONTINUED | OUTPATIENT
Start: 2021-02-11 | End: 2021-02-13

## 2021-02-11 RX ORDER — INFLUENZA VIRUS VACCINE 15; 15; 15; 15 UG/.5ML; UG/.5ML; UG/.5ML; UG/.5ML
0.7 SUSPENSION INTRAMUSCULAR ONCE
Refills: 0 | Status: DISCONTINUED | OUTPATIENT
Start: 2021-02-11 | End: 2021-02-13

## 2021-02-11 RX ORDER — DEXTROSE 50 % IN WATER 50 %
25 SYRINGE (ML) INTRAVENOUS ONCE
Refills: 0 | Status: DISCONTINUED | OUTPATIENT
Start: 2021-02-11 | End: 2021-02-12

## 2021-02-11 RX ORDER — INFLUENZA VIRUS VACCINE 15; 15; 15; 15 UG/.5ML; UG/.5ML; UG/.5ML; UG/.5ML
0.5 SUSPENSION INTRAMUSCULAR ONCE
Refills: 0 | Status: DISCONTINUED | OUTPATIENT
Start: 2021-02-11 | End: 2021-02-11

## 2021-02-11 RX ORDER — SODIUM CHLORIDE 9 MG/ML
1000 INJECTION, SOLUTION INTRAVENOUS
Refills: 0 | Status: DISCONTINUED | OUTPATIENT
Start: 2021-02-11 | End: 2021-02-11

## 2021-02-11 RX ORDER — ENOXAPARIN SODIUM 100 MG/ML
40 INJECTION SUBCUTANEOUS DAILY
Refills: 0 | Status: DISCONTINUED | OUTPATIENT
Start: 2021-02-11 | End: 2021-02-13

## 2021-02-11 RX ADMIN — SODIUM CHLORIDE 100 MILLILITER(S): 9 INJECTION, SOLUTION INTRAVENOUS at 18:40

## 2021-02-11 RX ADMIN — Medication 2: at 21:20

## 2021-02-11 RX ADMIN — SODIUM CHLORIDE 1000 MILLILITER(S): 9 INJECTION INTRAMUSCULAR; INTRAVENOUS; SUBCUTANEOUS at 04:28

## 2021-02-11 RX ADMIN — SODIUM CHLORIDE 1000 MILLILITER(S): 9 INJECTION INTRAMUSCULAR; INTRAVENOUS; SUBCUTANEOUS at 05:28

## 2021-02-11 NOTE — ED PROVIDER NOTE - GASTROINTESTINAL, MLM
Abdomen soft, non-tender, non-distended, no rebound, no guarding. +BS, no CVA tenderness, non-tender to deep palpation in all 4 quadrants

## 2021-02-11 NOTE — ED PROVIDER NOTE - OBJECTIVE STATEMENT
65 year old male with a history of DM (just diagnosed 1 week ago) presents with abdominal pain, diarrhea, and syncope.  Patient states he came home from work at 2am this morning, went to sleep and then at 3am, he woke up feeling nauseous with diffuse generalized abdominal pain.  He woke up his wife who led him into the bathroom where he had a large episode of diarrhea, non-bloody.  Patient then had a witnessed syncopal episode with LOC x 1 minute.  He was held by his wife and did not hit his head.  EMS was called and found patient awake but groggy.  FS at the scene was 154.  He was given 4mg zofran ODT and significantly improved en route.  He denies chest pain, SOB, headache.  He was admitted for syncope in 2017, states his work up was negative. He last ate fish from home.  His wife had the same meal and is well.  Currently denies any complaints. PMD Da Houser, Cardiology Dr. Dinero

## 2021-02-11 NOTE — CONSULT NOTE ADULT - ATTENDING COMMENTS
Pt seen,  examined. History reviewed. Images reviewed.  There is a large cystic, necrotic mass arising from the body of Pancreas ,involving Portal and splenic veins.  Pt will need further work up in by specialized Pancreatic Team. I discussed his problem with Dr. ADEEL Martinez and the pt is expected to be transferred under his care.

## 2021-02-11 NOTE — ED ADULT NURSE REASSESSMENT NOTE - NS ED NURSE REASSESS COMMENT FT1
pt comfortable and transferred to [facility]. nad upon transfer
pt feels good pain better pt pending transfer to [facility] pt only wants to eat or drink and explained that pt can not eat or drink since we do not know what surgical interventions will occur later and pt verbalized understanding
pt now has bed at [facility] and report to unit and pt pending transport
pt received on stretcher skin warm and dry no distress pt still c/o mild epigastric pains c/o weakness today denies recent fever or chills c/o nausea earlier and vomited  3 days ago saw pmd for abd pains and noted to have elevated sugars and started on metformin pt has familial h/o dm but abd pain not addressed per pt.  pt offers no other c/o pt pending surgical and cardio consult

## 2021-02-11 NOTE — H&P ADULT - NSHPREVIEWOFSYSTEMS_GEN_ALL_CORE
REVIEW OF SYSTEMS      General:	NAD    ENMT:	No scleral jaundice     Respiratory and Thorax: Patent airways.   	  Gastrointestinal:	Soft, non distended, no rebound. Mild epigastric tenderness.    Musculoskeletal:	warm to touch.    Neurological:	AAox4

## 2021-02-11 NOTE — CONSULT NOTE ADULT - ASSESSMENT
The patient is a 65 year old male with a history of DM who presents with syncope.    Plan:  - Syncope likely vasovagal in nature  - ECG with no evidence of ischemia or infarction  - Cardiac enzymes negative  - CT A/P with large pancreatitic mass  - Plan for transfer for further work-up of mass. He can be monitored on telemetry and an echo can be done at that point.

## 2021-02-11 NOTE — H&P ADULT - NSICDXPASTMEDICALHX_GEN_ALL_CORE_FT
PAST MEDICAL HISTORY:  DM (diabetes mellitus)     GERD (gastroesophageal reflux disease)     No pertinent past medical history

## 2021-02-11 NOTE — H&P ADULT - HISTORY OF PRESENT ILLNESS
66 yo m, recently diagnosed with type II DM on metformin. Earlier this morning at 3 am pt has had one episode of nonbloody emesis, followed by diarrheic bowel movement, and then lost consciousness in the bathroom. Got CT scan at [facility] ED which revealed large heterogenous pancreatic mass with a predominantly cystic component at the body and tail associated with main pancreatic ductal dilatation. Pt was directly admitted to surgical oncology service under Dr. Juan randhawa.

## 2021-02-11 NOTE — PHARMACOTHERAPY INTERVENTION NOTE - COMMENTS
This is an automatic interchange as per hospital policy of standard influenza vaccine to high-dose influenza vaccine
yes

## 2021-02-11 NOTE — ED PROVIDER NOTE - PROGRESS NOTE DETAILS
Results of CT discussed with patient.  He admits to recent 5-lb weight loss over the last 3 months as well as excessive weakness and fatigue.  He is aware that CT shows a pancreatic mass that will require further work up. anders (surg) seen eval pt, requests xfer to [facility]. called xfer center anders (surg) seen eval pt, requests xfer to [facility] or [facility]. called xfer center xfer center relates dr guzman (surg) will accept xfer to [facility] inpatient bed anders (surg) seen eval pt, requests xfer to [facility] or [facility] for dr guzman (surg) whom he already contacted. called xfer center

## 2021-02-11 NOTE — ED PROVIDER NOTE - CLINICAL SUMMARY MEDICAL DECISION MAKING FREE TEXT BOX
65 year old male recently dx with DM p/w abdominal pain, diarrhea and syncope.  N head trauma. Likely volume depletion.  Labs, EKG, CE, hydrate, CT abd/pelvis, reassess

## 2021-02-11 NOTE — H&P ADULT - ASSESSMENT
66 yo m, with pmh of GERD treated with famotidine. Recently diagnosed with type II DM on metformin. Reports that earlier this morning at 3 am has had one episode of nonbloody emesis, followed by diarrheic bowel movement, and then lost consciousness in the bathroom. Got CT scan at [facility] ED which revealed large heterogenous pancreatic mass with a predominantly cystic component at the body and tail associated with main pancreatic ductal dilatation. Pt was directly admitted to surgical oncology service under Dr. Martinez care.    Plan:

## 2021-02-11 NOTE — CONSULT NOTE ADULT - SUBJECTIVE AND OBJECTIVE BOX
Chief Complaint:  Patient is a 65y old  Male who presents with a chief complaint of abd painand abnormal imaging is present   · Chief Complaint: The patient is a 65y Male complaining of syncope.  · HPI Objective Statement: 65 year old male with a history of DM (just diagnosed 1 week ago) presents with abdominal pain, diarrhea, and syncope.  Patient states he came home from work at 2am this morning, went to sleep and then at 3am, he woke up feeling nauseous with diffuse generalized abdominal pain.  He woke up his wife who led him into the bathroom where he had a large episode of diarrhea, non-bloody.  Patient then had a witnessed syncopal episode with LOC x 1 minute.  He was held by his wife and did not hit his head.  EMS was called and found patient awake but groggy.  FS at the scene was 154.  He was given 4mg zofran ODT and significantly improved en route.  He denies chest pain, SOB, headache.  He was admitted for syncope in 2017, states his work up was negative. He last ate fish from home.  His wife had the same meal and is well.  Currently denies any complaints. PMD [place], Cardiology Dr. Dinero  · Presenting Symptoms: DIARRHEA, NAUSEA, PAIN  · Negative Findings: no abdominal distension, no blood in stool, no burning urination, no fever  · Location: abdomen  · Laterality: generalized  · Area: diffuse  · Radiation: no radiation  · Timing: sudden onset  · Duration: hour(s)  2  · Quality: sharp  · Severity: SEVERE  · Context: unknown  · Recent Exposure To: none known  · Aggravated Factors: none  · Relieving Factors: none      Allergies:  No Known Allergies      Medications:      PMHX/PSHX:  GERD (gastroesophageal reflux disease)    DM (diabetes mellitus)    No pertinent past medical history    No significant past surgical history        Family history:  Family history of stroke (Mother)    No pertinent family history in first degree relatives        Social History:     ROS:     General:  No wt loss, fevers, chills, night sweats, fatigue,   Eyes:  Good vision, no reported pain  ENT:  No sore throat, pain, runny nose, dysphagia  CV:  No pain, palpitations, hypo/hypertension  Resp:  No dyspnea, cough, tachypnea, wheezing  GI:  No pain, No nausea, No vomiting, No diarrhea, No constipation, No weight loss, No fever, No pruritis, No rectal bleeding, No tarry stools, No dysphagia,  :  No pain, bleeding, incontinence, nocturia  Muscle:  No pain, weakness  Neuro:  No weakness, tingling, memory problems  Psych:  No fatigue, insomnia, mood problems, depression  Endocrine:  No polyuria, polydipsia, cold/heat intolerance  Heme:  No petechiae, ecchymosis, easy bruisability  Skin:  No rash, tattoos, scars, edema      PHYSICAL EXAM:   Vital Signs:  Vital Signs Last 24 Hrs  T(C): 36.7 (11 Feb 2021 07:30), Max: 36.8 (11 Feb 2021 04:08)  T(F): 98 (11 Feb 2021 07:30), Max: 98.3 (11 Feb 2021 04:08)  HR: 86 (11 Feb 2021 07:30) (70 - 86)  BP: 124/68 (11 Feb 2021 07:30) (124/68 - 130/72)  BP(mean): --  RR: 18 (11 Feb 2021 07:30) (18 - 20)  SpO2: 99% (11 Feb 2021 07:30) (98% - 99%)  Daily Height in cm: 162.56 (11 Feb 2021 04:08)    Daily     GENERAL:  Appears stated age, well-groomed, well-nourished, no distress  HEENT:  NC/AT,  conjunctivae clear and pink, no thyromegaly, nodules, adenopathy, no JVD, sclera -anicteric  CHEST:  Full & symmetric excursion, no increased effort, breath sounds clear  HEART:  Regular rhythm, S1, S2, no murmur/rub/S3/S4, no abdominal bruit, no edema  ABDOMEN:  Soft, non-tender, non-distended, normoactive bowel sounds,  no masses ,no hepato-splenomegaly, no signs of chronic liver disease  EXTEREMITIES:  no cyanosis,clubbing or edema  SKIN:  No rash/erythema/ecchymoses/petechiae/wounds/abscess/warm/dry  NEURO:  Alert, oriented, no asterixis, no tremor, no encephalopathy    LABS:                        12.7   9.83  )-----------( 249      ( 11 Feb 2021 04:55 )             40.0     02-11    135  |  100  |  23  ----------------------------<  151<H>  4.0   |  23  |  0.94    Ca    9.3      11 Feb 2021 04:55    TPro  6.9  /  Alb  3.5  /  TBili  0.3  /  DBili  x   /  AST  27  /  ALT  31  /  AlkPhos  71  02-11    LIVER FUNCTIONS - ( 11 Feb 2021 04:55 )  Alb: 3.5 g/dL / Pro: 6.9 g/dL / ALK PHOS: 71 U/L / ALT: 31 U/L DA / AST: 27 U/L / GGT: x               Amylase Serum35      Lipase serum39       Ammonia--      Imaging:          
SURGERY PA CONSULT NOTE    66 y/o JAMES with PMHx of newly dx DM (3 week ago, on medication), lower back pain/lumbar disc issues (tx with steroids, pain management) presented to the ER s/p syncopal episode at home. Patient reports coming home from work early this am, feeling nauseous w/o vomiting, generalized abdominal pain, restlessness, went to bathroom and had a syncopal episode with diarrhea - nonbloody, loose stool.  Denies fever, chills, HA, SOB, CP, dysuria.  Admits to tolerating diet yesterday, normal BMs prior to this episode, 1 month h/o epigastric pain and fatigue - being worked up by PMD for gastritis.  Currently not nauseous, asking for food.  Reports 5 lb weight loss over 3 months despite polyphagia, polydipsia    Admitted in 2017 with syncope - wkup nml    Colonoscopy > 10 years ago - nml  Denies Endoscopy    PSHx - denies    SHx - denies smoking  quit EtOH 4 years ago - previously drank on the weekends      OBJECTIVE:   T(F): 98 (02-11-21 @ 07:30), Max: 98.3 (02-11-21 @ 04:08)  HR: 86 (02-11-21 @ 07:30) (70 - 86)  BP: 124/68 (02-11-21 @ 07:30) (124/68 - 130/72)  RR: 18 (02-11-21 @ 07:30) (18 - 20)  SpO2: 99% (02-11-21 @ 07:30) (98% - 99%)  CAPILLARY BLOOD GLUCOSE        I&O's Detail      Physical exam:  General: A+O x 3 in NAD  HEENT: PERRLA, EOM intact, non-icteric  Chest: Clear through auscultation bilaterally, No rales, rhonchi wheezes noted bilaterally  Heart: S1,S1 RRR, no murmurs noted  Abdomen: soft, non distended, non-tympanic, mild epigastric pain w/o guarding  Extremities: no edema noted, warm,  no calf tenderness     MEDICATIONS  (STANDING):    MEDICATIONS  (PRN):      LABS:                        12.7   9.83  )-----------( 249      ( 11 Feb 2021 04:55 )             40.0     02-11    135  |  100  |  23  ----------------------------<  151<H>  4.0   |  23  |  0.94    Ca    9.3      11 Feb 2021 04:55    TPro  6.9  /  Alb  3.5  /  TBili  0.3  /  DBili  x   /  AST  27  /  ALT  31  /  AlkPhos  71  02-11          CT ABD/PELVIS:  FINDINGS:  LOWER CHEST: Within normal limits.    LIVER: Within normal limits.  BILE DUCTS: No biliary dilatation.  GALLBLADDER: Within normal limits.  SPLEEN: Within normal limits.  PANCREAS: Diffusely enlarged pancreas with heterogeneous attenuation measuring 10.5 x 4.9 cm. In addition 6.2 x 7.7 cm sized rim-enhancing cystic area in the pancreatic body extending to the lesser sac. Pancreatic ductal dilatation in the tail measuring up to 7 mm (3-41). There is obliteration of the fat plane between the pancreatic head and duodenal second portion. There is mild narrowing of the splenic vein near the portal confluence concerning for vascular encasement (3-44).  ADRENALS: Within normal limits.  KIDNEYS/URETERS: Within normal limits.    BLADDER: Within normal limits.  REPRODUCTIVE ORGANS: Mild enlargement of the prostate    BOWEL: No bowel obstruction. Appendix is normal.  PERITONEUM: No ascites.  VESSELS: Moderate atherosclerotic disease.  RETROPERITONEUM/LYMPH NODES: No lymphadenopathy.  ABDOMINAL WALL: Tiny fat-containing umbilical hernia.  BONES: No acute abnormality.    IMPRESSION:  Large heterogeneous pancreatic mass with a predominantly cystic component in the body and tail. Pancreatic ductal dilatation but no biliary dilatation. Recommend further evaluation with MRI.      
History of Present Illness: The patient is a 65 year old male with a history of DM who presents with syncope. He states he woke up this morning with nausea and felt like he was going to vomit. He went to the bathroom, sat down, and then passed out. After he had a bowel movement with diarrhea. No palpitations, chest pain, shortness of breath. He states he has had multiple episodes of passing out under similar circumstances. He has had prior cardiac work-up that was unrevealing.    Past Medical/Surgical History:  DM    Medications:  Home Medications:  famotidine 20 mg oral tablet: 1 tab(s) orally 2 times a day (11 Feb 2021 04:12)  metFORMIN 500 mg oral tablet: 1 tab(s) orally 2 times a day (11 Feb 2021 04:12)      Family History: Non-contributory family history of premature cardiovascular atherosclerotic disease    Social History: No tobacco, alcohol or drug use    Review of Systems:  General: No fevers, chills, weight loss or gain  Skin: No rashes, color changes  Cardiovascular: No chest pain, orthopnea  Respiratory: No shortness of breath, cough  Gastrointestinal: No nausea, abdominal pain  Genitourinary: No incontinence, pain with urination  Musculoskeletal: No pain, swelling, decreased range of motion  Neurological: No headache, weakness  Psychiatric: No depression, anxiety  Endocrine: No weight loss or gain, increased thirst  All other systems are comprehensively negative.    Physical Exam:  Vitals:        Vital Signs Last 24 Hrs  T(C): 36.7 (11 Feb 2021 07:30), Max: 36.8 (11 Feb 2021 04:08)  T(F): 98 (11 Feb 2021 07:30), Max: 98.3 (11 Feb 2021 04:08)  HR: 86 (11 Feb 2021 07:30) (70 - 86)  BP: 124/68 (11 Feb 2021 07:30) (124/68 - 130/72)  BP(mean): --  RR: 18 (11 Feb 2021 07:30) (18 - 20)  SpO2: 99% (11 Feb 2021 07:30) (98% - 99%)  General: NAD  HEENT: MMM  Neck: No JVD, no carotid bruit  Lungs: CTAB  CV: RRR, nl S1/S2, no M/R/G  Abdomen: S/NT/ND, +BS  Extremities: No LE edema, no cyanosis  Neuro: AAOx3, non-focal  Skin: No rash    Labs:                        12.7   9.83  )-----------( 249      ( 11 Feb 2021 04:55 )             40.0     02-11    135  |  100  |  23  ----------------------------<  151<H>  4.0   |  23  |  0.94    Ca    9.3      11 Feb 2021 04:55    TPro  6.9  /  Alb  3.5  /  TBili  0.3  /  DBili  x   /  AST  27  /  ALT  31  /  AlkPhos  71  02-11    CARDIAC MARKERS ( 11 Feb 2021 04:55 )  .000 ng/mL / x     / 213 U/L / x     / 2.5 ng/mL          ECG: NSR, normal axis, no ST abnormality

## 2021-02-11 NOTE — CONSULT NOTE ADULT - ASSESSMENT
A/P: 66 y/o JAMES with pancreatic mass, syncopal episode:    1. further eval with MRI  2. tumor markers  3. patient for transfer to [facility] for surg onc eval and management    Patient seen by and case discussed with Dr. DON Palacios.

## 2021-02-11 NOTE — CONSULT NOTE ADULT - ASSESSMENT
panc mass   inc lfts      plan  adv diet  needs eus and bx of the mass  tumor markers   to be done at [facility]

## 2021-02-11 NOTE — ED ADULT NURSE NOTE - PMH
DM (diabetes mellitus)    GERD (gastroesophageal reflux disease)    No pertinent past medical history

## 2021-02-11 NOTE — ED ADULT NURSE NOTE - NSIMPLEMENTINTERV_GEN_ALL_ED
Implemented All Universal Safety Interventions:  [place] to call system. Call bell, personal items and telephone within reach. Instruct patient to call for assistance. Room bathroom lighting operational. Non-slip footwear when patient is off stretcher. Physically safe environment: no spills, clutter or unnecessary equipment. Stretcher in lowest position, wheels locked, appropriate side rails in place.

## 2021-02-11 NOTE — PROGRESS NOTE ADULT - ASSESSMENT
66 yo m, recently diagnosed with type II DM on metformin. Earlier this morning at 3 am pt has had one episode of nonbloody emesis, followed by diarrheic bowel movement, and then lost consciousness in the bathroom. Got CT scan at [facility] ED which revealed large heterogenous pancreatic mass with a predominantly cystic component at the body and tail associated with main pancreatic ductal dilatation. Pt was directly admitted to surgical oncology service under Dr. Martinez care.      Plan:   - NPO/ IVFm  - Labs: CBC, BMP, coags, type and screen   - Strict I/o's  - Plan of surgery during this admission       D team surgery  px [id number]

## 2021-02-12 DIAGNOSIS — Z71.89 OTHER SPECIFIED COUNSELING: ICD-10-CM

## 2021-02-12 DIAGNOSIS — K86.9 DISEASE OF PANCREAS, UNSPECIFIED: ICD-10-CM

## 2021-02-12 DIAGNOSIS — K21.9 GASTRO-ESOPHAGEAL REFLUX DISEASE WITHOUT ESOPHAGITIS: ICD-10-CM

## 2021-02-12 LAB
A1C WITH ESTIMATED AVERAGE GLUCOSE RESULT: 9.7 % — HIGH (ref 4–5.6)
ALBUMIN SERPL ELPH-MCNC: 3.7 G/DL — SIGNIFICANT CHANGE UP (ref 3.3–5)
ALP SERPL-CCNC: 67 U/L — SIGNIFICANT CHANGE UP (ref 40–120)
ALT FLD-CCNC: 16 U/L — SIGNIFICANT CHANGE UP (ref 4–41)
ANION GAP SERPL CALC-SCNC: 11 MMOL/L — SIGNIFICANT CHANGE UP (ref 7–14)
APTT BLD: 29.1 SEC — SIGNIFICANT CHANGE UP (ref 27–36.3)
AST SERPL-CCNC: 16 U/L — SIGNIFICANT CHANGE UP (ref 4–40)
BILIRUB DIRECT SERPL-MCNC: <0.2 MG/DL — SIGNIFICANT CHANGE UP (ref 0–0.2)
BILIRUB INDIRECT FLD-MCNC: SIGNIFICANT CHANGE UP MG/DL (ref 0–1)
BILIRUB SERPL-MCNC: <0.2 MG/DL — SIGNIFICANT CHANGE UP (ref 0.2–1.2)
BLD GP AB SCN SERPL QL: NEGATIVE — SIGNIFICANT CHANGE UP
BUN SERPL-MCNC: 13 MG/DL — SIGNIFICANT CHANGE UP (ref 7–23)
CALCIUM SERPL-MCNC: 9.2 MG/DL — SIGNIFICANT CHANGE UP (ref 8.4–10.5)
CHLORIDE SERPL-SCNC: 98 MMOL/L — SIGNIFICANT CHANGE UP (ref 98–107)
CO2 SERPL-SCNC: 25 MMOL/L — SIGNIFICANT CHANGE UP (ref 22–31)
CREAT SERPL-MCNC: 0.7 MG/DL — SIGNIFICANT CHANGE UP (ref 0.5–1.3)
ESTIMATED AVERAGE GLUCOSE: 232 MG/DL — HIGH (ref 68–114)
GLUCOSE BLDC GLUCOMTR-MCNC: 109 MG/DL — HIGH (ref 70–99)
GLUCOSE BLDC GLUCOMTR-MCNC: 157 MG/DL — HIGH (ref 70–99)
GLUCOSE BLDC GLUCOMTR-MCNC: 164 MG/DL — HIGH (ref 70–99)
GLUCOSE SERPL-MCNC: 171 MG/DL — HIGH (ref 70–99)
HCV AB S/CO SERPL IA: 0.09 S/CO — SIGNIFICANT CHANGE UP (ref 0–0.99)
HCV AB SERPL-IMP: SIGNIFICANT CHANGE UP
INR BLD: 1.02 RATIO — SIGNIFICANT CHANGE UP (ref 0.88–1.16)
MAGNESIUM SERPL-MCNC: 2 MG/DL — SIGNIFICANT CHANGE UP (ref 1.6–2.6)
PHOSPHATE SERPL-MCNC: 3.4 MG/DL — SIGNIFICANT CHANGE UP (ref 2.5–4.5)
POTASSIUM SERPL-MCNC: 4.2 MMOL/L — SIGNIFICANT CHANGE UP (ref 3.5–5.3)
POTASSIUM SERPL-SCNC: 4.2 MMOL/L — SIGNIFICANT CHANGE UP (ref 3.5–5.3)
PROT SERPL-MCNC: 6.5 G/DL — SIGNIFICANT CHANGE UP (ref 6–8.3)
PROTHROM AB SERPL-ACNC: 11.7 SEC — SIGNIFICANT CHANGE UP (ref 10.6–13.6)
RH IG SCN BLD-IMP: NEGATIVE — SIGNIFICANT CHANGE UP
SARS-COV-2 IGG SERPL QL IA: NEGATIVE — SIGNIFICANT CHANGE UP
SARS-COV-2 IGM SERPL IA-ACNC: 0.08 INDEX — SIGNIFICANT CHANGE UP
SODIUM SERPL-SCNC: 134 MMOL/L — LOW (ref 135–145)

## 2021-02-12 PROCEDURE — 99254 IP/OBS CNSLTJ NEW/EST MOD 60: CPT | Mod: GC

## 2021-02-12 RX ADMIN — Medication 1000 MILLIGRAM(S): at 05:08

## 2021-02-12 RX ADMIN — ENOXAPARIN SODIUM 40 MILLIGRAM(S): 100 INJECTION SUBCUTANEOUS at 14:06

## 2021-02-12 RX ADMIN — Medication 1: at 14:06

## 2021-02-12 RX ADMIN — Medication 1: at 19:55

## 2021-02-12 RX ADMIN — Medication 1000 MILLIGRAM(S): at 16:09

## 2021-02-12 RX ADMIN — Medication 3 MILLIGRAM(S): at 05:07

## 2021-02-12 NOTE — CONSULT NOTE ADULT - ASSESSMENT
Impression:  # Pancreatic Cyst with PD dilation: Unclear etiology; Ddx includes Mucinous cyst adenoma, IPMN, less likely serous cystadenoma. Given diffuse enlargement of pancreas; differential also includes autoimmune pancreatitis, but less often would present with cystic lesion    Recommendation:  - Follow up MRI  - EUS- FNA early next week  - Place NPO @ [facility] Sunday night  - Supportive care per primary team    Ena Grider MD  Gastroenterology Fellow  [phone]  [phone]  Available on [employer] Teams  Please page on call fellow on weekends and after 5pm on weekdays: [phone] Impression:  # Pancreatic Cyst with PD dilation: Unclear etiology; Ddx includes Mucinous cyst adenoma, IPMN, less likely serous cystadenoma. Given diffuse enlargement of pancreas; differential also includes autoimmune pancreatitis, but less often would present with cystic lesion    Recommendation:  - Follow up MRI  - EUS- FNA early next week  - Place NPO @ [facility] Monday night  - Supportive care per primary team    Ena Grider MD  Gastroenterology Fellow  [phone]  [phone]  Available on Microsoft Teams  Please page on call fellow on weekends and after 5pm on weekdays: [phone]

## 2021-02-12 NOTE — CHART NOTE - NSCHARTNOTEFT_GEN_A_CORE
CAPRINI SCORE    AGE RELATED RISK FACTORS                                                             [ ] Age 41-60 years                                            (1 Point)  [x] Age: 61-74 years                                           (2 Points)                 [ ] Age= 75 years                                                (3 Points)             DISEASE RELATED RISK FACTORS                                                       [ ] Edema in the lower extremities                 (1 Point)                     [ ] Varicose veins                                               (1 Point)                                 [ ] BMI > 25 Kg/m2                                            (1 Point)                                  [ ] Serious infection (ie PNA)                            (1 Point)                     [ ] Lung disease ( COPD, Emphysema)            (1 Point)                                                                          [ ] Acute myocardial infarction                         (1 Point)                  [ ] Congestive heart failure (in the previous month)  (1 Point)         [ ] Inflammatory bowel disease                            (1 Point)                  [ ] Central venous access, PICC or Port               (2 points)       (within the last month)                                                                [ ] Stroke (in the previous month)                        (5 Points)    [ ] Previous or present malignancy                       (2 points)                                                                                                                                                         HEMATOLOGY RELATED FACTORS                                                         [ ] Prior episodes of VTE                                     (3 Points)                     [ ] Positive family history for VTE                      (3 Points)                  [ ] Prothrombin 42079 A                                     (3 Points)                     [ ] Factor V Leiden                                                (3 Points)                        [ ] Lupus anticoagulants                                      (3 Points)                                                           [ ] Anticardiolipin antibodies                              (3 Points)                                                       [ ] High homocysteine in the blood                   (3 Points)                                             [ ] Other congenital or acquired thrombophilia      (3 Points)                                                [ ] Heparin induced thrombocytopenia                  (3 Points)                                        MOBILITY RELATED FACTORS  [ ] Bed rest                                                         (1 Point)  [ ] Plaster cast                                                    (2 points)  [ ] Bed bound for more than 72 hours           (2 Points)    GENDER SPECIFIC FACTORS  [ ] Pregnancy or had a baby within the last month   (1 Point)  [ ] Post-partum < 6 weeks                                   (1 Point)  [ ] Hormonal therapy  or oral contraception   (1 Point)  [ ] History of pregnancy complications              (1 point)  [ ] Unexplained or recurrent              (1 Point)    OTHER RISK FACTORS                                           (1 Point)  BMI >40, smoking, diabetes requiring insulin, chemotherapy  blood transfusions and length of surgery over 2 hours    SURGERY RELATED RISK FACTORS  [ ]  Section within the last month     (1 Point)  [ ] Minor surgery                                                  (1 Point)  [ ] Arthroscopic surgery                                       (2 Points)  [x] Planned major surgery lasting more            (2 Points)      than 45 minutes     [ ] Elective hip or knee joint replacement       (5 points)       surgery                                                TRAUMA RELATED RISK FACTORS  [ ] Fracture of the hip, pelvis, or leg                       (5 Points)  [ ] Spinal cord injury resulting in paralysis             (5 points)       (in the previous month)    [ ] Paralysis  (less than 1 month)                             (5 Points)  [ ] Multiple Trauma within 1 month                        (5 Points)    Total Score [   4     ]    Caprini Score 0-2: Low Risk, NO VTE prophylaxis required for most patients, encourage ambulation  Caprini Score 3-6: Moderate Risk , pharmacologic VTE prophylaxis is indicated for most patients (in the absence of contraindications)  Caprini Score Greater than or =7: High risk, pharmacologic VTE prophylaxis indicated for most patients (in the absence of contraindications)

## 2021-02-12 NOTE — PROGRESS NOTE ADULT - ASSESSMENT
64 yo m, recently diagnosed with type II DM on metformin. Earlier this morning at 3 am pt has had one episode of nonbloody emesis, followed by diarrheic bowel movement, and then lost consciousness in the bathroom. Got CT scan at [facility] ED which revealed large heterogenous pancreatic mass with a predominantly cystic component at the body and tail associated with main pancreatic ductal dilatation. Pt was directly admitted to surgical oncology service under Dr. Juan randhawa.      Plan:   - MRCP today  - Pain control  - RDcc  - Strict I/o's  - DVT ppx/OOB/A  - Plan of surgery during this admission     D team surgery  px [id number]

## 2021-02-12 NOTE — CONSULT NOTE ADULT - SUBJECTIVE AND OBJECTIVE BOX
Chief Complaint:  Patient is a 65y old  Male who presents with a chief complaint of Pancreatic mass (12 Feb 2021 02:41)    HPI:  65 year old man with hx of DM presents with from home with syncope. Patient reports one day of feeling nausea with generalized abdominal pain and one subsequent episode of NB diarrhea. Shortly after this episode he had a syncopal episode with loss of consciousness for 1 minute. No reports of shaking, urinary incontinence and fecal incontinence. No complaints of fevers, chills, chest pain, shortness of breath, vomiting, melena, hematemesis, hematochezia and cough. Found to have CT findings of a    Allergies:  No Known Allergies      Home Medications:  Reviewed    Hospital Medications:  acetaminophen    Suspension .. 1000 milliGRAM(s) Oral every 6 hours PRN  dextrose 40% Gel 15 Gram(s) Oral once  dextrose 5%. 1000 milliLiter(s) IV Continuous <Continuous>  dextrose 50% Injectable 25 Gram(s) IV Push once  enoxaparin Injectable 40 milliGRAM(s) SubCutaneous daily  glucagon  Injectable 1 milliGRAM(s) IntraMuscular once  influenza  Vaccine (HIGH DOSE) 0.7 milliLiter(s) IntraMuscular once  insulin lispro (ADMELOG) corrective regimen sliding scale   SubCutaneous Before meals and at bedtime  melatonin 3 milliGRAM(s) Oral at bedtime PRN      PMHX/PSHX:  GERD (gastroesophageal reflux disease)    DM (diabetes mellitus)    No pertinent past medical history    No significant past surgical history        Family history:  Family history of stroke (Mother)    No pertinent family history in first degree relatives        Social History:     ROS:   General:  No fevers, chills or night sweats.  ENT:  No sore throat or dysphagia  CV:  No pain or palpitations  Resp:  No dyspnea, cough, wheezing  GI:  See H&P  Skin:  No rash or edema      PHYSICAL EXAM:   GENERAL:  NAD, Appears stated age  HEENT:  NC/AT,  conjunctivae clear and pink, sclera -anicteric  CHEST:  CTA B/L, Normal effort  HEART:  RRR S1/S2, No murmurs  ABDOMEN:  Soft, non-tender, non-distended, BS+  EXTEREMITIES:  No cyanosis or Edema  SKIN:  Warm & Dry. No rash or erythema  NEURO:  Alert, oriented    Vital Signs:  Vital Signs Last 24 Hrs  T(C): 36.7 (12 Feb 2021 05:02), Max: 36.7 (12 Feb 2021 05:02)  T(F): 98 (12 Feb 2021 05:02), Max: 98 (12 Feb 2021 05:02)  HR: 74 (12 Feb 2021 05:02) (74 - 88)  BP: 122/76 (12 Feb 2021 05:02) (120/72 - 150/94)  BP(mean): 98 (11 Feb 2021 17:03) (98 - 98)  RR: 18 (12 Feb 2021 05:02) (17 - 18)  SpO2: 98% (12 Feb 2021 05:02) (97% - 100%)  Daily Height in cm: 167.64 (12 Feb 2021 00:23)    Daily     LABS:                        12.7   9.83  )-----------( 249      ( 11 Feb 2021 04:55 )             40.0       02-12    134<L>  |  98  |  13  ----------------------------<  171<H>  4.2   |  25  |  0.70    Ca    9.2      12 Feb 2021 07:57  Phos  3.4     02-12  Mg     2.0     02-12    TPro  6.5  /  Alb  3.7  /  TBili  <0.2  /  DBili  <0.2  /  AST  16  /  ALT  16  /  AlkPhos  67  02-12    LIVER FUNCTIONS - ( 12 Feb 2021 07:57 )  Alb: 3.7 g/dL / Pro: 6.5 g/dL / ALK PHOS: 67 U/L / ALT: 16 U/L / AST: 16 U/L / GGT: x           PT/INR - ( 12 Feb 2021 07:57 )   PT: 11.7 sec;   INR: 1.02 ratio         PTT - ( 12 Feb 2021 07:57 )  PTT:29.1 sec                            12.7   9.83  )-----------( 249      ( 11 Feb 2021 04:55 )             40.0     Imaging:           Chief Complaint:  Patient is a 65y old  Male who presents with a chief complaint of Pancreatic mass (12 Feb 2021 02:41)    HPI:  65 year old man with hx of DM presents with from home with syncope. Patient reports one day of feeling nausea with generalized abdominal pain and one subsequent episode of NB diarrhea. Shortly after this episode he had a syncopal episode with loss of consciousness for 1 minute. No reports of shaking, urinary incontinence and fecal incontinence. No complaints of fevers, chills, chest pain, shortness of breath, vomiting, melena, hematemesis, hematochezia and cough. Found to have CT findings of a 6.2 x 7.7 cm sized rim-enhancing cystic area in the pancreatic body extending to the lesser sac & a diffusely enlarged pancreas.    Allergies:  No Known Allergies    Home Medications:  Reviewed    Hospital Medications:  acetaminophen    Suspension .. 1000 milliGRAM(s) Oral every 6 hours PRN  dextrose 40% Gel 15 Gram(s) Oral once  dextrose 5%. 1000 milliLiter(s) IV Continuous <Continuous>  dextrose 50% Injectable 25 Gram(s) IV Push once  enoxaparin Injectable 40 milliGRAM(s) SubCutaneous daily  glucagon  Injectable 1 milliGRAM(s) IntraMuscular once  influenza  Vaccine (HIGH DOSE) 0.7 milliLiter(s) IntraMuscular once  insulin lispro (ADMELOG) corrective regimen sliding scale   SubCutaneous Before meals and at bedtime  melatonin 3 milliGRAM(s) Oral at bedtime PRN    PMHX/PSHX:  GERD (gastroesophageal reflux disease)  DM (diabetes mellitus)  No pertinent past medical history  No significant past surgical history    Family history:  Family history of stroke (Mother)  No pertinent family history in first degree relatives    Social History:     ROS:   General:  No fevers, chills or night sweats.  ENT:  No sore throat or dysphagia  CV:  No pain or palpitations  Resp:  No dyspnea, cough, wheezing  GI:  See H&P  Skin:  No rash or edema    PHYSICAL EXAM:   GENERAL:  NAD, Appears stated age  HEENT:  NC/AT,  conjunctivae clear and pink  CHEST:  CTA B/L, Normal effort  HEART:  RRR S1/S2, No murmurs  ABDOMEN:  Soft, non-tender, non-distended, BS+  EXTEREMITIES:  No cyanosis or Edema  SKIN:  Warm & Dry.  NEURO:  Alert, oriented    Vital Signs:  Vital Signs Last 24 Hrs  T(C): 36.7 (12 Feb 2021 05:02), Max: 36.7 (12 Feb 2021 05:02)  T(F): 98 (12 Feb 2021 05:02), Max: 98 (12 Feb 2021 05:02)  HR: 74 (12 Feb 2021 05:02) (74 - 88)  BP: 122/76 (12 Feb 2021 05:02) (120/72 - 150/94)  BP(mean): 98 (11 Feb 2021 17:03) (98 - 98)  RR: 18 (12 Feb 2021 05:02) (17 - 18)  SpO2: 98% (12 Feb 2021 05:02) (97% - 100%)  Daily Height in cm: 167.64 (12 Feb 2021 00:23)    Daily     LABS:                        12.7   9.83  )-----------( 249      ( 11 Feb 2021 04:55 )             40.0       02-12    134<L>  |  98  |  13  ----------------------------<  171<H>  4.2   |  25  |  0.70    Ca    9.2      12 Feb 2021 07:57  Phos  3.4     02-12  Mg     2.0     02-12    TPro  6.5  /  Alb  3.7  /  TBili  <0.2  /  DBili  <0.2  /  AST  16  /  ALT  16  /  AlkPhos  67  02-12    LIVER FUNCTIONS - ( 12 Feb 2021 07:57 )  Alb: 3.7 g/dL / Pro: 6.5 g/dL / ALK PHOS: 67 U/L / ALT: 16 U/L / AST: 16 U/L / GGT: x           PT/INR - ( 12 Feb 2021 07:57 )   PT: 11.7 sec;   INR: 1.02 ratio         PTT - ( 12 Feb 2021 07:57 )  PTT:29.1 sec                            12.7   9.83  )-----------( 249      ( 11 Feb 2021 04:55 )             40.0     Imaging:

## 2021-02-13 ENCOUNTER — TRANSCRIPTION ENCOUNTER (OUTPATIENT)
Age: 66
End: 2021-02-13

## 2021-02-13 VITALS
TEMPERATURE: 98 F | DIASTOLIC BLOOD PRESSURE: 80 MMHG | OXYGEN SATURATION: 99 % | HEART RATE: 74 BPM | SYSTOLIC BLOOD PRESSURE: 132 MMHG | RESPIRATION RATE: 16 BRPM

## 2021-02-13 LAB
ALBUMIN SERPL ELPH-MCNC: 3.8 G/DL — SIGNIFICANT CHANGE UP (ref 3.3–5)
ALP SERPL-CCNC: 67 U/L — SIGNIFICANT CHANGE UP (ref 40–120)
ALT FLD-CCNC: 16 U/L — SIGNIFICANT CHANGE UP (ref 4–41)
AMYLASE P1 CFR SERPL: 46 U/L — SIGNIFICANT CHANGE UP (ref 25–125)
ANION GAP SERPL CALC-SCNC: 14 MMOL/L — SIGNIFICANT CHANGE UP (ref 7–14)
AST SERPL-CCNC: 14 U/L — SIGNIFICANT CHANGE UP (ref 4–40)
BILIRUB DIRECT SERPL-MCNC: <0.2 MG/DL — SIGNIFICANT CHANGE UP (ref 0–0.2)
BILIRUB INDIRECT FLD-MCNC: SIGNIFICANT CHANGE UP MG/DL (ref 0–1)
BILIRUB SERPL-MCNC: <0.2 MG/DL — SIGNIFICANT CHANGE UP (ref 0.2–1.2)
BUN SERPL-MCNC: 16 MG/DL — SIGNIFICANT CHANGE UP (ref 7–23)
CALCIUM SERPL-MCNC: 9.2 MG/DL — SIGNIFICANT CHANGE UP (ref 8.4–10.5)
CANCER AG125 SERPL-ACNC: 102 U/ML — HIGH
CANCER AG19-9 SERPL-ACNC: 1430 U/ML — HIGH
CEA SERPL-MCNC: 33.7 NG/ML — HIGH (ref 1–3.8)
CHLORIDE SERPL-SCNC: 101 MMOL/L — SIGNIFICANT CHANGE UP (ref 98–107)
CO2 SERPL-SCNC: 21 MMOL/L — LOW (ref 22–31)
CREAT SERPL-MCNC: 0.74 MG/DL — SIGNIFICANT CHANGE UP (ref 0.5–1.3)
GLUCOSE BLDC GLUCOMTR-MCNC: 111 MG/DL — HIGH (ref 70–99)
GLUCOSE BLDC GLUCOMTR-MCNC: 96 MG/DL — SIGNIFICANT CHANGE UP (ref 70–99)
GLUCOSE SERPL-MCNC: 101 MG/DL — HIGH (ref 70–99)
HCT VFR BLD CALC: 40.2 % — SIGNIFICANT CHANGE UP (ref 39–50)
HGB BLD-MCNC: 12.9 G/DL — LOW (ref 13–17)
LIDOCAIN IGE QN: 16 U/L — SIGNIFICANT CHANGE UP (ref 7–60)
MAGNESIUM SERPL-MCNC: 2.2 MG/DL — SIGNIFICANT CHANGE UP (ref 1.6–2.6)
MCHC RBC-ENTMCNC: 27.7 PG — SIGNIFICANT CHANGE UP (ref 27–34)
MCHC RBC-ENTMCNC: 32.1 GM/DL — SIGNIFICANT CHANGE UP (ref 32–36)
MCV RBC AUTO: 86.3 FL — SIGNIFICANT CHANGE UP (ref 80–100)
NRBC # BLD: 0 /100 WBCS — SIGNIFICANT CHANGE UP
NRBC # FLD: 0 K/UL — SIGNIFICANT CHANGE UP
PHOSPHATE SERPL-MCNC: 3.5 MG/DL — SIGNIFICANT CHANGE UP (ref 2.5–4.5)
PLATELET # BLD AUTO: 250 K/UL — SIGNIFICANT CHANGE UP (ref 150–400)
POTASSIUM SERPL-MCNC: 3.9 MMOL/L — SIGNIFICANT CHANGE UP (ref 3.5–5.3)
POTASSIUM SERPL-SCNC: 3.9 MMOL/L — SIGNIFICANT CHANGE UP (ref 3.5–5.3)
PROT SERPL-MCNC: 7.1 G/DL — SIGNIFICANT CHANGE UP (ref 6–8.3)
RBC # BLD: 4.66 M/UL — SIGNIFICANT CHANGE UP (ref 4.2–5.8)
RBC # FLD: 13.7 % — SIGNIFICANT CHANGE UP (ref 10.3–14.5)
SODIUM SERPL-SCNC: 136 MMOL/L — SIGNIFICANT CHANGE UP (ref 135–145)
TRIGL SERPL-MCNC: 105 MG/DL — SIGNIFICANT CHANGE UP
WBC # BLD: 6.64 K/UL — SIGNIFICANT CHANGE UP (ref 3.8–10.5)
WBC # FLD AUTO: 6.64 K/UL — SIGNIFICANT CHANGE UP (ref 3.8–10.5)

## 2021-02-13 PROCEDURE — 74183 MRI ABD W/O CNTR FLWD CNTR: CPT | Mod: 26

## 2021-02-13 RX ORDER — INSULIN LISPRO 100/ML
VIAL (ML) SUBCUTANEOUS
Refills: 0 | Status: DISCONTINUED | OUTPATIENT
Start: 2021-02-13 | End: 2021-02-13

## 2021-02-13 RX ORDER — OXYCODONE HYDROCHLORIDE 5 MG/1
1 TABLET ORAL
Qty: 12 | Refills: 0
Start: 2021-02-13 | End: 2021-02-15

## 2021-02-13 RX ORDER — ACETAMINOPHEN 500 MG
31.25 TABLET ORAL
Qty: 0 | Refills: 0 | DISCHARGE
Start: 2021-02-13

## 2021-02-13 RX ORDER — INSULIN LISPRO 100/ML
VIAL (ML) SUBCUTANEOUS EVERY 6 HOURS
Refills: 0 | Status: DISCONTINUED | OUTPATIENT
Start: 2021-02-13 | End: 2021-02-13

## 2021-02-13 RX ADMIN — Medication 1000 MILLIGRAM(S): at 03:15

## 2021-02-13 RX ADMIN — Medication 1000 MILLIGRAM(S): at 08:26

## 2021-02-13 RX ADMIN — Medication 3 MILLIGRAM(S): at 03:16

## 2021-02-13 NOTE — PROGRESS NOTE ADULT - PROBLEM SELECTOR PLAN 1
incidentally noted on imaging at [facility]  pending MRCP   plans for EUS/FNA on Tuesday  diet as tolerated; NPO p MN Monday
incidentally noted on imaging at [facility]  pending MRCP   plans for EUS/FNA on Tuesday  diet as tolerated; NPO p MN Monday

## 2021-02-13 NOTE — PROGRESS NOTE ADULT - PROBLEM SELECTOR PLAN 3
Advanced care planning was discussed with patient and family.  Advanced care planning forms were reviewed and discussed.  Risks, benefits and alternatives of gastroenterologic procedures were discussed in detail and all questions were answered.  30 minutes spent.
Advanced care planning was discussed with patient and family.  Advanced care planning forms were reviewed and discussed.  Risks, benefits and alternatives of gastroenterologic procedures were discussed in detail and all questions were answered.  30 minutes spent.

## 2021-02-13 NOTE — DISCHARGE NOTE NURSING/CASE MANAGEMENT/SOCIAL WORK - PATIENT PORTAL LINK FT
You can access the FollowMyHealth Patient Portal offered by [facility] by registering at the following website: [email or web address]. By joining [facility]’s FollowMyHealth portal, you will also be able to view your health information using other applications (apps) compatible with our system.

## 2021-02-13 NOTE — DISCHARGE NOTE PROVIDER - NSDCMRMEDTOKEN_GEN_ALL_CORE_FT
acetaminophen 160 mg/5 mL oral suspension: 31.25 milliliter(s) orally every 6 hours, As needed, Mild Pain (1 - 3), Moderate Pain (4 - 6)  famotidine 20 mg oral tablet: 1 tab(s) orally 2 times a day  metFORMIN 500 mg oral tablet: 1 tab(s) orally 2 times a day  oxyCODONE 5 mg oral tablet: 1 tab(s) orally every 6 hours MDD:4 tabs

## 2021-02-13 NOTE — DISCHARGE NOTE PROVIDER - CARE PROVIDER_API CALL
Mik Martinez)  Surgery  [address], [place] [address]  Phone: [phone]  Fax: ()-  Follow Up Time: 1 week

## 2021-02-13 NOTE — PROGRESS NOTE ADULT - SUBJECTIVE AND OBJECTIVE BOX
INTERVAL HPI/OVERNIGHT EVENTS:    feeling well   without abd complaints this morning; no pain or n/v    MEDICATIONS  (STANDING):  dextrose 40% Gel 15 Gram(s) Oral once  dextrose 5%. 1000 milliLiter(s) (50 mL/Hr) IV Continuous <Continuous>  dextrose 50% Injectable 25 Gram(s) IV Push once  enoxaparin Injectable 40 milliGRAM(s) SubCutaneous daily  glucagon  Injectable 1 milliGRAM(s) IntraMuscular once  influenza  Vaccine (HIGH DOSE) 0.7 milliLiter(s) IntraMuscular once  insulin lispro (ADMELOG) corrective regimen sliding scale   SubCutaneous Before meals and at bedtime    MEDICATIONS  (PRN):  acetaminophen    Suspension .. 1000 milliGRAM(s) Oral every 6 hours PRN Mild Pain (1 - 3), Moderate Pain (4 - 6)  melatonin 3 milliGRAM(s) Oral at bedtime PRN Sleep      Allergies    No Known Allergies    Intolerances        Review of Systems:    General:  No wt loss, fevers, chills, night sweats, fatigue   Eyes:  Good vision, no reported pain  ENT:  No sore throat, pain, runny nose, dysphagia  CV:  No pain, palpitations, hypo/hypertension  Resp:  No dyspnea, cough, tachypnea, wheezing  GI:  No pain, No nausea, No vomiting, No diarrhea, No constipation, No weight loss, No fever, No pruritis, No rectal bleeding, No melena, No dysphagia  :  No pain, bleeding, incontinence, nocturia  Muscle:  No pain, weakness  Neuro:  No weakness, tingling, memory problems  Psych:  No fatigue, insomnia, mood problems, depression  Endocrine:  No polyuria, polydypsia, cold/heat intolerance  Heme:  No petechiae, ecchymosis, easy bruisability  Skin:  No rash, tattoos, scars, edema      Vital Signs Last 24 Hrs  T(C): 36.7 (12 Feb 2021 11:02), Max: 36.7 (12 Feb 2021 05:02)  T(F): 98 (12 Feb 2021 11:02), Max: 98 (12 Feb 2021 05:02)  HR: 78 (12 Feb 2021 11:02) (74 - 88)  BP: 128/72 (12 Feb 2021 11:02) (122/76 - 150/94)  BP(mean): 98 (11 Feb 2021 17:03) (98 - 98)  RR: 18 (12 Feb 2021 11:02) (18 - 18)  SpO2: 98% (12 Feb 2021 11:02) (97% - 100%)    PHYSICAL EXAM:    Constitutional: NAD  HEENT: EOMI, throat clear  Neck: No LAD, supple  Respiratory: CTA and P  Cardiovascular: S1 and S2, RRR, no M  Gastrointestinal: BS+, soft, NT/ND, neg HSM,  Extremities: No peripheral edema, neg clubbing, cyanosis  Vascular: 2+ peripheral pulses  Neurological: A/O x 3, no focal deficits  Psychiatric: Normal mood, normal affect  Skin: No rashes      LABS:                        12.7   9.83  )-----------( 249      ( 11 Feb 2021 04:55 )             40.0     02-12    134<L>  |  98  |  13  ----------------------------<  171<H>  4.2   |  25  |  0.70    Ca    9.2      12 Feb 2021 07:57  Phos  3.4     02-12  Mg     2.0     02-12    TPro  6.5  /  Alb  3.7  /  TBili  <0.2  /  DBili  <0.2  /  AST  16  /  ALT  16  /  AlkPhos  67  02-12    PT/INR - ( 12 Feb 2021 07:57 )   PT: 11.7 sec;   INR: 1.02 ratio         PTT - ( 12 Feb 2021 07:57 )  PTT:29.1 sec      RADIOLOGY & ADDITIONAL TESTS:  
Surgery Progress Note  Patient is a 65y old  Male who presents with a chief complaint of Pancreatic mass (12 Feb 2021 09:54)      SUBJECTIVE:   NPO since midnight  NAEON  Patient seen and examined at bedside with surgical team, patient without complaints.     Physical Exam  General Appearance: Resting comfortably, no acute distress  Chest: Patent airways  CV: Pulse regular presently  Abdomen: Soft,  non-distended, mild epigastric tenderness  Extremities: warm and well perfused    Vital Signs Last 24 Hrs  T(C): 37.2 (12 Feb 2021 20:22), Max: 37.2 (12 Feb 2021 20:22)  T(F): 98.9 (12 Feb 2021 20:22), Max: 98.9 (12 Feb 2021 20:22)  HR: 83 (12 Feb 2021 17:38) (74 - 83)  BP: 132/77 (12 Feb 2021 20:22) (122/76 - 148/80)  BP(mean): --  RR: 18 (12 Feb 2021 20:22) (18 - 18)  SpO2: 98% (12 Feb 2021 20:22) (98% - 99%)    I&O's Detail    12 Feb 2021 07:01  -  13 Feb 2021 02:49  --------------------------------------------------------  IN:  Total IN: 0 mL    OUT:    Voided (mL): 700 mL  Total OUT: 700 mL    Total NET: -700 mL      MEDICATIONS  (STANDING):  enoxaparin Injectable 40 milliGRAM(s) SubCutaneous daily  influenza  Vaccine (HIGH DOSE) 0.7 milliLiter(s) IntraMuscular once  insulin lispro (ADMELOG) corrective regimen sliding scale   SubCutaneous every 6 hours    MEDICATIONS  (PRN):  acetaminophen    Suspension .. 1000 milliGRAM(s) Oral every 6 hours PRN Mild Pain (1 - 3), Moderate Pain (4 - 6)  melatonin 3 milliGRAM(s) Oral at bedtime PRN Sleep      LABS:                        12.7   9.83  )-----------( 249      ( 11 Feb 2021 04:55 )             40.0     02-12    134<L>  |  98  |  13  ----------------------------<  171<H>  4.2   |  25  |  0.70    Ca    9.2      12 Feb 2021 07:57  Phos  3.4     02-12  Mg     2.0     02-12    TPro  6.5  /  Alb  3.7  /  TBili  <0.2  /  DBili  <0.2  /  AST  16  /  ALT  16  /  AlkPhos  67  02-12    PT/INR - ( 12 Feb 2021 07:57 )   PT: 11.7 sec;   INR: 1.02 ratio         PTT - ( 12 Feb 2021 07:57 )  PTT:29.1 sec  LIVER FUNCTIONS - ( 12 Feb 2021 07:57 )  Alb: 3.7 g/dL / Pro: 6.5 g/dL / ALK PHOS: 67 U/L / ALT: 16 U/L / AST: 16 U/L / GGT: x             ABO Interpretation: O (02-12-21 @ 08:12)        
INTERVAL HPI/OVERNIGHT EVENTS:    feeling well   without abd complaints this morning; no pain or n/v    MEDICATIONS  (STANDING):  dextrose 40% Gel 15 Gram(s) Oral once  dextrose 5%. 1000 milliLiter(s) (50 mL/Hr) IV Continuous <Continuous>  dextrose 50% Injectable 25 Gram(s) IV Push once  enoxaparin Injectable 40 milliGRAM(s) SubCutaneous daily  glucagon  Injectable 1 milliGRAM(s) IntraMuscular once  influenza  Vaccine (HIGH DOSE) 0.7 milliLiter(s) IntraMuscular once  insulin lispro (ADMELOG) corrective regimen sliding scale   SubCutaneous Before meals and at bedtime    MEDICATIONS  (PRN):  acetaminophen    Suspension .. 1000 milliGRAM(s) Oral every 6 hours PRN Mild Pain (1 - 3), Moderate Pain (4 - 6)  melatonin 3 milliGRAM(s) Oral at bedtime PRN Sleep      Allergies    No Known Allergies    Intolerances        Review of Systems:    General:  No wt loss, fevers, chills, night sweats, fatigue   Eyes:  Good vision, no reported pain  ENT:  No sore throat, pain, runny nose, dysphagia  CV:  No pain, palpitations, hypo/hypertension  Resp:  No dyspnea, cough, tachypnea, wheezing  GI:  No pain, No nausea, No vomiting, No diarrhea, No constipation, No weight loss, No fever, No pruritis, No rectal bleeding, No melena, No dysphagia  :  No pain, bleeding, incontinence, nocturia  Muscle:  No pain, weakness  Neuro:  No weakness, tingling, memory problems  Psych:  No fatigue, insomnia, mood problems, depression  Endocrine:  No polyuria, polydypsia, cold/heat intolerance  Heme:  No petechiae, ecchymosis, easy bruisability  Skin:  No rash, tattoos, scars, edema      Vital Signs Last 24 Hrs  T(C): 36.7 (12 Feb 2021 11:02), Max: 36.7 (12 Feb 2021 05:02)  T(F): 98 (12 Feb 2021 11:02), Max: 98 (12 Feb 2021 05:02)  HR: 78 (12 Feb 2021 11:02) (74 - 88)  BP: 128/72 (12 Feb 2021 11:02) (122/76 - 150/94)  BP(mean): 98 (11 Feb 2021 17:03) (98 - 98)  RR: 18 (12 Feb 2021 11:02) (18 - 18)  SpO2: 98% (12 Feb 2021 11:02) (97% - 100%)    PHYSICAL EXAM:    Constitutional: NAD  HEENT: EOMI, throat clear  Neck: No LAD, supple  Respiratory: CTA and P  Cardiovascular: S1 and S2, RRR, no M  Gastrointestinal: BS+, soft, NT/ND, neg HSM,  Extremities: No peripheral edema, neg clubbing, cyanosis  Vascular: 2+ peripheral pulses  Neurological: A/O x 3, no focal deficits  Psychiatric: Normal mood, normal affect  Skin: No rashes      LABS:                        12.7   9.83  )-----------( 249      ( 11 Feb 2021 04:55 )             40.0     02-12    134<L>  |  98  |  13  ----------------------------<  171<H>  4.2   |  25  |  0.70    Ca    9.2      12 Feb 2021 07:57  Phos  3.4     02-12  Mg     2.0     02-12    TPro  6.5  /  Alb  3.7  /  TBili  <0.2  /  DBili  <0.2  /  AST  16  /  ALT  16  /  AlkPhos  67  02-12    PT/INR - ( 12 Feb 2021 07:57 )   PT: 11.7 sec;   INR: 1.02 ratio         PTT - ( 12 Feb 2021 07:57 )  PTT:29.1 sec      RADIOLOGY & ADDITIONAL TESTS:  
Patient see and examined at bed side.   Felling well.   Admits to mild epigastric pain (2/10).   NPO  Passing gas, no BM      Physical Exam  General Appearance: Resting comfortably, no acute distress  Chest: Patent airways  CV: Pulse regular presently  Abdomen: Soft,  non-distended, mild epigastric tenderness  Extremities: warm and well perfused      Vital Signs Last 24 Hrs  T(C): 36.4 (11 Feb 2021 17:03), Max: 36.8 (11 Feb 2021 04:08)  T(F): 97.6 (11 Feb 2021 17:03), Max: 98.3 (11 Feb 2021 04:08)  HR: 87 (11 Feb 2021 17:03) (70 - 88)  BP: 144/81 (11 Feb 2021 17:03) (120/72 - 150/94)  BP(mean): 98 (11 Feb 2021 17:03) (98 - 98)  RR: 18 (11 Feb 2021 17:03) (17 - 20)  SpO2: 100% (11 Feb 2021 17:03) (97% - 100%)    I&O's Detail      02-11    135  |  100  |  23  ----------------------------<  151<H>  4.0   |  23  |  0.94    Ca    9.3      11 Feb 2021 04:55    TPro  6.9  /  Alb  3.5  /  TBili  0.3  /  DBili  x   /  AST  27  /  ALT  31  /  AlkPhos  71  02-11                            12.7   9.83  )-----------( 249      ( 11 Feb 2021 04:55 )             40.0           LABS:                         12.7   9.83  )-----------( 249      ( 11 Feb 2021 04:55 )             40.0     02-11    135  |  100  |  23  ----------------------------<  151<H>  4.0   |  23  |  0.94    Ca    9.3      11 Feb 2021 04:55    TPro  6.9  /  Alb  3.5  /  TBili  0.3  /  DBili  x   /  AST  27  /  ALT  31  /  AlkPhos  71  02-11    < from: CT Abdomen and Pelvis w/ IV Cont (02.11.21 @ 05:26) >    EXAM:  CT ABDOMEN AND PELVIS IC                            *** ADDENDUM 02/11/2021  ***    Bones: Trabecular and cortical thickening of the right ilium (3-92). Leading differential is Paget's disease.    *** END OF ADDENDUM 02/11/2021  ***            PROCEDURE DATE:  02/11/2021          INTERPRETATION:  CLINICAL INFORMATION: Abdominal pain, diarrhea and syncope.    COMPARISON: None.    PROCEDURE:  CT of the Abdomen and Pelvis was performed with intravenous contrast.  Intravenous contrast: 75 mlOmnipaque 350. 25 ml discarded.  Oral contrast: None.  Sagittal and coronal reformats were performed.    FINDINGS:  LOWER CHEST: Within normal limits.    LIVER: Within normal limits.  BILE DUCTS: No biliary dilatation.  GALLBLADDER: Within normal limits.  SPLEEN: Within normal limits.  PANCREAS: Diffusely enlarged pancreas with heterogeneous attenuation measuring 10.5 x 4.9 cm. In addition 6.2 x 7.7 cm sized rim-enhancing cystic area in the pancreatic body extending to the lesser sac. Pancreatic ductal dilatation in the tail measuring up to 7 mm (3-41). There is obliteration of the fat plane between the pancreatic head and duodenal second portion. There is mild narrowing of the splenic vein near the portal confluence concerning for vascular encasement (3-44).  ADRENALS: Within normal limits.  KIDNEYS/URETERS: Within normal limits.    BLADDER: Within normal limits.  REPRODUCTIVE ORGANS: Mild enlargement of the prostate    BOWEL: No bowel obstruction. Appendix is normal.  PERITONEUM: No ascites.  VESSELS: Moderate atherosclerotic disease.  RETROPERITONEUM/LYMPH NODES: No lymphadenopathy.  ABDOMINAL WALL: Tiny fat-containing umbilical hernia.  BONES: No acute abnormality.    IMPRESSION:  Large heterogeneous pancreatic mass with a predominantly cystic component in the body and tail. Pancreatic ductal dilatation but no biliary dilatation. Recommend further evaluation with MRI.        ***Please see the addendum at the top of this report. It may contain additional important information or changes.****        JOSE FRANCISCO MALIK MD; Attending Radiologist  This document has been electronically signed. Feb 11 2021  6:19AM  Addend:JOSE FRANCISCO MALIK MD; Attending Radiologist  This addendum was electronically signed on: Feb 11 2021  7:34AM.    < end of copied text >    MEDICATIONS  (STANDING):  dextrose 40% Gel 15 Gram(s) Oral once  dextrose 5% + sodium chloride 0.45%. 1000 milliLiter(s) (100 mL/Hr) IV Continuous <Continuous>  dextrose 5%. 1000 milliLiter(s) (50 mL/Hr) IV Continuous <Continuous>  dextrose 50% Injectable 25 Gram(s) IV Push once  glucagon  Injectable 1 milliGRAM(s) IntraMuscular once  insulin lispro (ADMELOG) corrective regimen sliding scale   SubCutaneous every 6 hours    MEDICATIONS  (PRN):            
Surgery Progress Note  Patient is a 65y old  Male who presents with a chief complaint of Pancreatic mass (11 Feb 2021 17:57)      SUBJECTIVE:   NAEON  Patient seen and examined at bedside with surgical team, patient without complaints.     Physical Exam  Constitutional: NAD  Respiratory: breathing comfortably on RA  Ext:     Vital Signs Last 24 Hrs  T(C): 36.6 (11 Feb 2021 20:00), Max: 36.8 (11 Feb 2021 04:08)  T(F): 97.9 (11 Feb 2021 20:00), Max: 98.3 (11 Feb 2021 04:08)  HR: 85 (11 Feb 2021 20:00) (70 - 88)  BP: 137/69 (11 Feb 2021 20:00) (120/72 - 150/94)  BP(mean): 98 (11 Feb 2021 17:03) (98 - 98)  RR: 18 (11 Feb 2021 20:00) (17 - 20)  SpO2: 100% (11 Feb 2021 20:00) (97% - 100%)    I&O's Detail  MEDICATIONS  (STANDING):  dextrose 40% Gel 15 Gram(s) Oral once  dextrose 5%. 1000 milliLiter(s) (50 mL/Hr) IV Continuous <Continuous>  dextrose 50% Injectable 25 Gram(s) IV Push once  enoxaparin Injectable 40 milliGRAM(s) SubCutaneous daily  glucagon  Injectable 1 milliGRAM(s) IntraMuscular once  influenza  Vaccine (HIGH DOSE) 0.7 milliLiter(s) IntraMuscular once  insulin lispro (ADMELOG) corrective regimen sliding scale   SubCutaneous Before meals and at bedtime    MEDICATIONS  (PRN):  acetaminophen    Suspension .. 1000 milliGRAM(s) Oral every 6 hours PRN Mild Pain (1 - 3), Moderate Pain (4 - 6)  melatonin 3 milliGRAM(s) Oral at bedtime PRN Sleep      LABS:                        12.7   9.83  )-----------( 249      ( 11 Feb 2021 04:55 )             40.0     02-11    135  |  100  |  23  ----------------------------<  151<H>  4.0   |  23  |  0.94    Ca    9.3      11 Feb 2021 04:55    TPro  6.9  /  Alb  3.5  /  TBili  0.3  /  DBili  x   /  AST  27  /  ALT  31  /  AlkPhos  71  02-11      LIVER FUNCTIONS - ( 11 Feb 2021 04:55 )  Alb: 3.5 g/dL / Pro: 6.9 g/dL / ALK PHOS: 71 U/L / ALT: 31 U/L DA / AST: 27 U/L / GGT: x

## 2021-02-13 NOTE — PROGRESS NOTE ADULT - PROBLEM SELECTOR PLAN 2
gi ppx with protonix qd   maalox prn   gerd precautions with PO intake
gi ppx with protonix qd   maalox prn   gerd precautions with PO intake

## 2021-02-13 NOTE — PROGRESS NOTE ADULT - ASSESSMENT
65M, recently diagnosed with type II DM on metformin. Earlier this morning at 3 am pt has had one episode of nonbloody emesis, followed by diarrheic bowel movement, and then lost consciousness in the bathroom. Got CT scan at [facility] ED which revealed large heterogenous pancreatic mass with a predominantly cystic component at the body and tail associated with main pancreatic ductal dilatation. Pt was directly admitted to surgical oncology service under Dr. Martinez care.      Plan:   - MRCP today  - Appreciate GI recs: EGD/EUS with [facility] next week  - Pain control  - NPO since midnight  - ISS  - Strict I/o's  - DVT ppx/OOB/A  - F/u AM labs  - Plan of surgery during this admission     D team surgery  [phone] [id number]   65M, recently diagnosed with type II DM on metformin. Earlier this morning at 3 am pt has had one episode of nonbloody emesis, followed by diarrheic bowel movement, and then lost consciousness in the bathroom. Got CT scan at [facility] ED which revealed large heterogenous pancreatic mass with a predominantly cystic component at the body and tail associated with main pancreatic ductal dilatation. Pt was directly admitted to surgical oncology service under Dr. Martinez care.      Plan:   - MRCP f/u read  - Appreciate GI recs: EGD/EUS with [facility] next week  - Pain control  - NPO since midnight  - ISS  - Strict I/o's  - DVT ppx/OOB/A  - F/u AM labs  - Plan of surgery during this admission     D team surgery  [phone]

## 2021-02-13 NOTE — DISCHARGE NOTE PROVIDER - HOSPITAL COURSE
65 year old male, recently diagnosed with type II DM on metformin. Earlier this morning at 3 am pt had one episode of nonbloody emesis, followed by diarrhea, and then lost consciousness in the bathroom. Got CT scan at [facility] ED which revealed large heterogenous pancreatic mass with a predominantly cystic component at the body and tail associated with main pancreatic ductal dilatation. Pt was directly admitted to surgical oncology service under Dr. Martinez care.  Patient got an MRCP and was planned for endoscopy, exam under sedation and a fine needle aspiration.  Decision was made collectively with patient, his wife, and surgeon to have patient go home and schedule procedure as an outpatient. On day of discharge, the patient was tolerating diet, ambulating well and pain controlled.

## 2021-02-13 NOTE — DISCHARGE NOTE PROVIDER - NSDCCPCAREPLAN_GEN_ALL_CORE_FT
PRINCIPAL DISCHARGE DIAGNOSIS  Diagnosis: Pancreatic lesion  Assessment and Plan of Treatment: Pancreatic lesion

## 2021-02-13 NOTE — DISCHARGE NOTE NURSING/CASE MANAGEMENT/SOCIAL WORK - NSDCPNINST_GEN_ALL_CORE
take medication as ordered, follow up with MD as advised, gradually increase activity, eat a heart healthy, low sugar diet

## 2021-02-17 ENCOUNTER — NON-APPOINTMENT (OUTPATIENT)
Age: 66
End: 2021-02-17

## 2021-02-17 ENCOUNTER — APPOINTMENT (OUTPATIENT)
Dept: DISASTER EMERGENCY | Facility: CLINIC | Age: 66
End: 2021-02-17

## 2021-02-17 DIAGNOSIS — Z01.818 ENCOUNTER FOR OTHER PREPROCEDURAL EXAMINATION: ICD-10-CM

## 2021-02-17 PROBLEM — E11.9 TYPE 2 DIABETES MELLITUS WITHOUT COMPLICATIONS: Chronic | Status: ACTIVE | Noted: 2021-02-11

## 2021-02-18 ENCOUNTER — NON-APPOINTMENT (OUTPATIENT)
Age: 66
End: 2021-02-18

## 2021-02-18 LAB — SARS-COV-2 N GENE NPH QL NAA+PROBE: NOT DETECTED

## 2021-02-18 RX ORDER — SODIUM CHLORIDE 9 MG/ML
1000 INJECTION, SOLUTION INTRAVENOUS
Refills: 0 | Status: DISCONTINUED | OUTPATIENT
Start: 2021-02-19 | End: 2021-03-05

## 2021-02-18 RX ORDER — SODIUM CHLORIDE 9 MG/ML
3 INJECTION INTRAMUSCULAR; INTRAVENOUS; SUBCUTANEOUS EVERY 8 HOURS
Refills: 0 | Status: DISCONTINUED | OUTPATIENT
Start: 2021-02-19 | End: 2021-03-05

## 2021-02-19 ENCOUNTER — RESULT REVIEW (OUTPATIENT)
Age: 66
End: 2021-02-19

## 2021-02-19 ENCOUNTER — OUTPATIENT (OUTPATIENT)
Dept: OUTPATIENT SERVICES | Facility: HOSPITAL | Age: 66
LOS: 1 days | Discharge: HOME | End: 2021-02-19
Payer: COMMERCIAL

## 2021-02-19 ENCOUNTER — APPOINTMENT (OUTPATIENT)
Dept: GASTROENTEROLOGY | Facility: HOSPITAL | Age: 66
End: 2021-02-19

## 2021-02-19 VITALS
RESPIRATION RATE: 14 BRPM | TEMPERATURE: 97 F | SYSTOLIC BLOOD PRESSURE: 128 MMHG | OXYGEN SATURATION: 100 % | HEART RATE: 61 BPM | DIASTOLIC BLOOD PRESSURE: 70 MMHG | WEIGHT: 130.07 LBS | HEIGHT: 66 IN

## 2021-02-19 VITALS
SYSTOLIC BLOOD PRESSURE: 141 MMHG | RESPIRATION RATE: 19 BRPM | OXYGEN SATURATION: 100 % | HEART RATE: 60 BPM | DIASTOLIC BLOOD PRESSURE: 89 MMHG

## 2021-02-19 DIAGNOSIS — K86.89 OTHER SPECIFIED DISEASES OF PANCREAS: ICD-10-CM

## 2021-02-19 LAB
GLUCOSE BLDC GLUCOMTR-MCNC: 101 MG/DL — HIGH (ref 70–99)
GLUCOSE BLDC GLUCOMTR-MCNC: 138 MG/DL — HIGH (ref 70–99)

## 2021-02-19 PROCEDURE — 88305 TISSUE EXAM BY PATHOLOGIST: CPT | Mod: 26

## 2021-02-19 PROCEDURE — 43242 EGD US FINE NEEDLE BX/ASPIR: CPT | Mod: GC

## 2021-02-19 PROCEDURE — 88307 TISSUE EXAM BY PATHOLOGIST: CPT | Mod: 26

## 2021-02-19 PROCEDURE — 88173 CYTOPATH EVAL FNA REPORT: CPT | Mod: 26

## 2021-02-19 PROCEDURE — 43239 EGD BIOPSY SINGLE/MULTIPLE: CPT | Mod: 59,GC

## 2021-02-19 RX ORDER — METFORMIN HYDROCHLORIDE 850 MG/1
1 TABLET ORAL
Qty: 0 | Refills: 0 | DISCHARGE

## 2021-02-19 RX ORDER — FAMOTIDINE 10 MG/ML
1 INJECTION INTRAVENOUS
Qty: 0 | Refills: 0 | DISCHARGE

## 2021-02-19 RX ADMIN — SODIUM CHLORIDE 30 MILLILITER(S): 9 INJECTION, SOLUTION INTRAVENOUS at 10:47

## 2021-02-22 LAB — NON-GYNECOLOGICAL CYTOLOGY STUDY: SIGNIFICANT CHANGE UP

## 2021-02-23 ENCOUNTER — RESULT REVIEW (OUTPATIENT)
Age: 66
End: 2021-02-23

## 2021-02-23 ENCOUNTER — APPOINTMENT (OUTPATIENT)
Dept: MULTI SPECIALTY CLINIC | Facility: CLINIC | Age: 66
End: 2021-02-23
Payer: COMMERCIAL

## 2021-02-23 ENCOUNTER — OUTPATIENT (OUTPATIENT)
Dept: OUTPATIENT SERVICES | Facility: HOSPITAL | Age: 66
LOS: 1 days | Discharge: HOME | End: 2021-02-23

## 2021-02-23 ENCOUNTER — NON-APPOINTMENT (OUTPATIENT)
Age: 66
End: 2021-02-23

## 2021-02-23 VITALS
HEIGHT: 66.73 IN | HEART RATE: 93 BPM | BODY MASS INDEX: 20.24 KG/M2 | SYSTOLIC BLOOD PRESSURE: 143 MMHG | RESPIRATION RATE: 16 BRPM | WEIGHT: 127.43 LBS | TEMPERATURE: 97.7 F | DIASTOLIC BLOOD PRESSURE: 84 MMHG | OXYGEN SATURATION: 99 %

## 2021-02-23 DIAGNOSIS — C25.9 MALIGNANT NEOPLASM OF PANCREAS, UNSPECIFIED: ICD-10-CM

## 2021-02-23 DIAGNOSIS — K21.9 GASTRO-ESOPHAGEAL REFLUX DISEASE W/OUT ESOPHAGITIS: ICD-10-CM

## 2021-02-23 DIAGNOSIS — E11.9 TYPE 2 DIABETES MELLITUS W/OUT COMPLICATIONS: ICD-10-CM

## 2021-02-23 LAB
BASOPHILS # BLD AUTO: 0.08 K/UL — SIGNIFICANT CHANGE UP (ref 0–0.2)
BASOPHILS NFR BLD AUTO: 0.8 % — SIGNIFICANT CHANGE UP (ref 0–2)
EOSINOPHIL # BLD AUTO: 0.18 K/UL — SIGNIFICANT CHANGE UP (ref 0–0.5)
EOSINOPHIL NFR BLD AUTO: 1.8 % — SIGNIFICANT CHANGE UP (ref 0–6)
HCT VFR BLD CALC: 41.4 % — SIGNIFICANT CHANGE UP (ref 39–50)
HGB BLD-MCNC: 13.6 G/DL — SIGNIFICANT CHANGE UP (ref 13–17)
IMM GRANULOCYTES NFR BLD AUTO: 0.4 % — SIGNIFICANT CHANGE UP (ref 0–1.5)
LYMPHOCYTES # BLD AUTO: 2.29 K/UL — SIGNIFICANT CHANGE UP (ref 1–3.3)
LYMPHOCYTES # BLD AUTO: 22.8 % — SIGNIFICANT CHANGE UP (ref 13–44)
MCHC RBC-ENTMCNC: 28.3 PG — SIGNIFICANT CHANGE UP (ref 27–34)
MCHC RBC-ENTMCNC: 32.9 G/DL — SIGNIFICANT CHANGE UP (ref 32–36)
MCV RBC AUTO: 86.3 FL — SIGNIFICANT CHANGE UP (ref 80–100)
MONOCYTES # BLD AUTO: 0.83 K/UL — SIGNIFICANT CHANGE UP (ref 0–0.9)
MONOCYTES NFR BLD AUTO: 8.3 % — SIGNIFICANT CHANGE UP (ref 2–14)
NEUTROPHILS # BLD AUTO: 6.61 K/UL — SIGNIFICANT CHANGE UP (ref 1.8–7.4)
NEUTROPHILS NFR BLD AUTO: 65.9 % — SIGNIFICANT CHANGE UP (ref 43–77)
NRBC # BLD: 0 /100 WBCS — SIGNIFICANT CHANGE UP (ref 0–0)
PLATELET # BLD AUTO: 339 K/UL — SIGNIFICANT CHANGE UP (ref 150–400)
RBC # BLD: 4.8 M/UL — SIGNIFICANT CHANGE UP (ref 4.2–5.8)
RBC # FLD: 13.2 % — SIGNIFICANT CHANGE UP (ref 10.3–14.5)
SURGICAL PATHOLOGY STUDY: SIGNIFICANT CHANGE UP
WBC # BLD: 10.03 K/UL — SIGNIFICANT CHANGE UP (ref 3.8–10.5)
WBC # FLD AUTO: 10.03 K/UL — SIGNIFICANT CHANGE UP (ref 3.8–10.5)

## 2021-02-23 PROCEDURE — 99072 ADDL SUPL MATRL&STAF TM PHE: CPT

## 2021-02-23 PROCEDURE — 99205 OFFICE O/P NEW HI 60 MIN: CPT

## 2021-02-23 PROCEDURE — 99242 OFF/OP CONSLTJ NEW/EST SF 20: CPT

## 2021-02-23 RX ORDER — PANCRELIPASE 36000; 180000; 114000 [USP'U]/1; [USP'U]/1; [USP'U]/1
36000-114000 CAPSULE, DELAYED RELEASE PELLETS ORAL
Qty: 180 | Refills: 2 | Status: ACTIVE | COMMUNITY
Start: 2021-02-23 | End: 1900-01-01

## 2021-02-23 RX ORDER — OXYCODONE HYDROCHLORIDE 5 MG/1
5 CAPSULE ORAL
Refills: 0 | Status: ACTIVE | COMMUNITY
Start: 2021-02-23

## 2021-02-23 RX ORDER — METFORMIN HYDROCHLORIDE 500 MG/1
500 TABLET, COATED ORAL
Qty: 180 | Refills: 2 | Status: ACTIVE | COMMUNITY
Start: 2021-02-23

## 2021-02-23 RX ORDER — CIPROFLOXACIN HYDROCHLORIDE 500 MG/1
500 TABLET, FILM COATED ORAL TWICE DAILY
Qty: 8 | Refills: 0 | Status: DISCONTINUED | COMMUNITY
Start: 2021-02-19 | End: 2021-02-23
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
## 2021-02-23 NOTE — REVIEW OF SYSTEMS
[Recent Change In Weight] : ~T recent weight change [Abdominal Pain] : abdominal pain [Negative] : Allergic/Immunologic [FreeTextEntry2] : 12 pounds [FreeTextEntry7] : epigastric and midback pain; floating stools

## 2021-02-23 NOTE — PHYSICAL EXAM
[Restricted in physically strenuous activity but ambulatory and able to carry out work of a light or sedentary nature] : Status 1- Restricted in physically strenuous activity but ambulatory and able to carry out work of a light or sedentary nature, e.g., light house work, office work [Normal] : affect appropriate [de-identified] : tender in epigastrum; mildly distended abdomen.

## 2021-02-23 NOTE — CONSULT LETTER
[Dear  ___] : Dear  [unfilled], [Consult Letter:] : I had the pleasure of evaluating your patient, [unfilled]. [Please see my note below.] : Please see my note below. [Consult Closing:] : Thank you very much for allowing me to participate in the care of this patient.  If you have any questions, please do not hesitate to contact me. [Sincerely,] : Sincerely, [DrAlla  ___] : Dr. GIRON [DrAlla ___] : Dr. GIRON

## 2021-02-24 ENCOUNTER — NON-APPOINTMENT (OUTPATIENT)
Age: 66
End: 2021-02-24

## 2021-02-24 LAB
25(OH)D3 SERPL-MCNC: 37.4 NG/ML
ALBUMIN SERPL ELPH-MCNC: 4.3 G/DL
ALP BLD-CCNC: 74 U/L
ALT SERPL-CCNC: 14 U/L
AMYLASE/CREAT SERPL: 43 U/L
ANION GAP SERPL CALC-SCNC: 13 MMOL/L
APTT BLD: 33.2 SEC
AST SERPL-CCNC: 18 U/L
BILIRUB SERPL-MCNC: 0.2 MG/DL
BUN SERPL-MCNC: 12 MG/DL
CALCIUM SERPL-MCNC: 9.7 MG/DL
CANCER AG19-9 SERPL-ACNC: 1419 U/ML
CEA SERPL-MCNC: 30.3 NG/ML
CHLORIDE SERPL-SCNC: 98 MMOL/L
CHOLEST SERPL-MCNC: 144 MG/DL
CO2 SERPL-SCNC: 25 MMOL/L
CREAT SERPL-MCNC: 0.9 MG/DL
FERRITIN SERPL-MCNC: 102 NG/ML
GLUCOSE SERPL-MCNC: 94 MG/DL
HBV SURFACE AB SER QL: NONREACTIVE
HBV SURFACE AG SER QL: NONREACTIVE
HCV AB SER QL: NONREACTIVE
HCV S/CO RATIO: 0.12 S/CO
HDLC SERPL-MCNC: 37 MG/DL
INR PPP: 1 RATIO
LDH SERPL-CCNC: 155 U/L
LDLC SERPL CALC-MCNC: 87 MG/DL
LPL SERPL-CCNC: 13 U/L
NONHDLC SERPL-MCNC: 108 MG/DL
POTASSIUM SERPL-SCNC: 4.2 MMOL/L
PROT SERPL-MCNC: 7 G/DL
PT BLD: 11.8 SEC
SODIUM SERPL-SCNC: 136 MMOL/L
TRIGL SERPL-MCNC: 103 MG/DL

## 2021-02-25 LAB
ESTIMATED AVERAGE GLUCOSE: 229 MG/DL
HBA1C MFR BLD HPLC: 9.6 %

## 2021-03-02 ENCOUNTER — APPOINTMENT (OUTPATIENT)
Dept: CT IMAGING | Facility: IMAGING CENTER | Age: 66
End: 2021-03-02
Payer: COMMERCIAL

## 2021-03-02 ENCOUNTER — RESULT REVIEW (OUTPATIENT)
Age: 66
End: 2021-03-02

## 2021-03-02 ENCOUNTER — APPOINTMENT (OUTPATIENT)
Dept: ULTRASOUND IMAGING | Facility: IMAGING CENTER | Age: 66
End: 2021-03-02
Payer: COMMERCIAL

## 2021-03-02 ENCOUNTER — OUTPATIENT (OUTPATIENT)
Dept: OUTPATIENT SERVICES | Facility: HOSPITAL | Age: 66
LOS: 1 days | End: 2021-03-02
Payer: COMMERCIAL

## 2021-03-02 DIAGNOSIS — C25.9 MALIGNANT NEOPLASM OF PANCREAS, UNSPECIFIED: ICD-10-CM

## 2021-03-02 DIAGNOSIS — Z00.8 ENCOUNTER FOR OTHER GENERAL EXAMINATION: ICD-10-CM

## 2021-03-02 PROCEDURE — 88172 CYTP DX EVAL FNA 1ST EA SITE: CPT

## 2021-03-02 PROCEDURE — 88305 TISSUE EXAM BY PATHOLOGIST: CPT | Mod: 26

## 2021-03-02 PROCEDURE — 71250 CT THORAX DX C-: CPT

## 2021-03-02 PROCEDURE — 88173 CYTOPATH EVAL FNA REPORT: CPT | Mod: 26

## 2021-03-02 PROCEDURE — 88173 CYTOPATH EVAL FNA REPORT: CPT

## 2021-03-02 PROCEDURE — 88305 TISSUE EXAM BY PATHOLOGIST: CPT

## 2021-03-02 PROCEDURE — 10005 FNA BX W/US GDN 1ST LES: CPT

## 2021-03-02 PROCEDURE — 71250 CT THORAX DX C-: CPT | Mod: 26

## 2021-03-03 LAB — NON-GYNECOLOGICAL CYTOLOGY STUDY: SIGNIFICANT CHANGE UP

## 2021-03-04 LAB
DPYD GENOTYPE: NORMAL
DPYD PHENOTYPE: NORMAL
DPYD SPECIMEN: NORMAL
FULL GENE SEQUENCE RESULT: NORMAL
INTERPRETATION PGDFRB: NORMAL
Lab: NORMAL
REF LAB TEST METHOD: NORMAL
REVIEWED BY: NORMAL
TA REPEAT RESULT: NORMAL
TEST PERFORMANCE INFO SPEC: NORMAL

## 2021-03-05 ENCOUNTER — APPOINTMENT (OUTPATIENT)
Dept: HEMATOLOGY ONCOLOGY | Facility: CLINIC | Age: 66
End: 2021-03-05

## 2021-03-09 ENCOUNTER — NON-APPOINTMENT (OUTPATIENT)
Age: 66
End: 2021-03-09

## 2021-03-09 NOTE — ASSESSMENT
[FreeTextEntry1] : Mr. MARBELLA ABBASI is a 65 year old male who presents today for an initial consultation to [facility]. \par Diagnosed with pancreatic adenocarcinoma and likely stage 4 disease with omental caking. Discussed that surgery would not be indicated at this time. At this time I believe it is best to proceed with chemotherapy and proceed with a systemic approach. Discussed that chemotherapy can serve as an in vivo test to see if the tumor will respond to that specific chemotherapy regimen. Reviewed that <10 % of patients will have disease that progresses on chemotherapy; the majority will have tumors that respond to treatment or tumors that remain stable. \par Discussed that current chemotherapy is much improved from chemotherapy in the past for two main reasons includin) It is now much more effective and 2) It is much better tolerated. Will need mediport placement and chest imaging for completeness.  Discussed that we will re-review his staging CT scan after 2 months of chemotherapy.\par \par Today, I personally spent 30 minutes in direct face to face time with the patient, of which greater than 50% of the time was spent in patient education and counseling as described above.\par  \par

## 2021-03-09 NOTE — PHYSICAL EXAM
[FreeTextEntry1] : General: No acute distress. Well nourished and well kempt.\par Head: AT/NC\par Eyes: PERRL. EOMI.\par Pulmonary: No respiratory distress. \par ABD: Soft. NT/ND. No rebound, no guarding, no rigidity. No peritoneal signs. No masses.  \par Extremities: Warm. No edema. 2+ pulses.\par Neurological: A&O x 4.\par Psychiatric: Normal affect and mood.\par

## 2021-03-10 ENCOUNTER — APPOINTMENT (OUTPATIENT)
Dept: HEMATOLOGY ONCOLOGY | Facility: CLINIC | Age: 66
End: 2021-03-10

## 2021-03-11 ENCOUNTER — APPOINTMENT (OUTPATIENT)
Dept: INFUSION THERAPY | Facility: HOSPITAL | Age: 66
End: 2021-03-11

## 2021-03-13 ENCOUNTER — APPOINTMENT (OUTPATIENT)
Dept: INFUSION THERAPY | Facility: HOSPITAL | Age: 66
End: 2021-03-13

## 2021-03-25 ENCOUNTER — APPOINTMENT (OUTPATIENT)
Dept: INFUSION THERAPY | Facility: HOSPITAL | Age: 66
End: 2021-03-25

## 2021-03-27 ENCOUNTER — APPOINTMENT (OUTPATIENT)
Dept: INFUSION THERAPY | Facility: HOSPITAL | Age: 66
End: 2021-03-27

## 2021-04-08 ENCOUNTER — APPOINTMENT (OUTPATIENT)
Dept: INFUSION THERAPY | Facility: HOSPITAL | Age: 66
End: 2021-04-08

## 2021-04-10 ENCOUNTER — APPOINTMENT (OUTPATIENT)
Dept: INFUSION THERAPY | Facility: HOSPITAL | Age: 66
End: 2021-04-10

## 2021-04-12 NOTE — PHYSICAL EXAM
[FreeTextEntry1] : General: No acute distress. Well nourished and well kempt.\par Head: AT/NC\par Eyes: PERRL. EOMI.\par Pulmonary: No respiratory distress.\par ABD: Soft. NT/ND. No rebound, no guarding, no rigidity. No peritoneal signs. No masses. \par Extremities: Warm. No edema. \par Neurological: A&O x 4.\par Psychiatric: Normal affect and mood.\par
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
## 2021-04-12 NOTE — ASSESSMENT
[FreeTextEntry1] : 21\par Surg Onc\par Dr Martinez\par \par Mr. MARBELLA ABBASI is a 65 year old male who presents today for an initial consultation to [facility]. \par Diagnosed with pancreatic adenocarcinoma and likely stage 4 disease with omental caking. Discussed that surgery would not be indicated at this time. At this time I believe it is best to proceed with chemotherapy and proceed with a systemic approach. Discussed that chemotherapy can serve as an in vivo test to see if the tumor will respond to that specific chemotherapy regimen. Reviewed that <10 % of patients will have disease that progresses on chemotherapy; the majority will have tumors that respond to treatment or tumors that remain stable. \par Discussed that current chemotherapy is much improved from chemotherapy in the past for two main reasons includin) It is now much more effective and 2) It is much better tolerated. Will need mediport placement and chest imaging for completeness. 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 Patient case reviewed in detail today at Pancreatic\par     Multidisciplinary Conference. \par     \par     Arrange appointment with Dietician. I will start Creon to help increase digestion and\par     weight loss.\par     \par     See genetic counselor. \par     \par     Lab testing and pharmacogenomics.\par     \par     Case reviewed with surgeon, pathology, and radiology. There does not seem to be\par     enough tissue for molecular analysis. He would need a biopsy of one of the omental\par     nodules to send to [facility]. I will arrange with radiology and have discussed. He\par     does think that he can access the lesions for biopsy.\par     \par     Discussed Mediport and will arrange.\par     \par     Discussed FOLFIRINOX chemotherapy. Review administration, side effects, benefits,\par     dose modifications and delays. Provided written material and reivewed\par     entire packet.\par     \par     Start stool softener for constipation related to narcotic - colace and miralax.\par     \par     CT chest arranged to complete staging.\par     \par     Finance contacted to review coverage of chemotherapy.\par     \par     See me 3/5/21 for follow-up.\par Diabetes (250.00) (E11.9)\par  · New since 2021.\par     Started Metformin.\par GERD (gastroesophageal reflux disease) (530.81) (K21.9)\par  · Famotidine PRN\par

## 2021-06-13 ENCOUNTER — INPATIENT (INPATIENT)
Facility: HOSPITAL | Age: 66
LOS: 1 days | Discharge: HOME | DRG: 435 | End: 2021-06-15
Attending: INTERNAL MEDICINE | Admitting: INTERNAL MEDICINE
Payer: COMMERCIAL

## 2021-06-13 VITALS
OXYGEN SATURATION: 95 % | HEIGHT: 66 IN | TEMPERATURE: 99 F | HEART RATE: 125 BPM | RESPIRATION RATE: 18 BRPM | SYSTOLIC BLOOD PRESSURE: 100 MMHG | DIASTOLIC BLOOD PRESSURE: 60 MMHG

## 2021-06-13 DIAGNOSIS — E11.9 TYPE 2 DIABETES MELLITUS WITHOUT COMPLICATIONS: ICD-10-CM

## 2021-06-13 DIAGNOSIS — R41.0 DISORIENTATION, UNSPECIFIED: ICD-10-CM

## 2021-06-13 DIAGNOSIS — R50.9 FEVER, UNSPECIFIED: ICD-10-CM

## 2021-06-13 DIAGNOSIS — R06.6 HICCOUGH: ICD-10-CM

## 2021-06-13 DIAGNOSIS — E87.1 HYPO-OSMOLALITY AND HYPONATREMIA: ICD-10-CM

## 2021-06-13 DIAGNOSIS — C25.9 MALIGNANT NEOPLASM OF PANCREAS, UNSPECIFIED: ICD-10-CM

## 2021-06-13 PROCEDURE — 84300 ASSAY OF URINE SODIUM: CPT

## 2021-06-13 PROCEDURE — 99285 EMERGENCY DEPT VISIT HI MDM: CPT

## 2021-06-13 PROCEDURE — 83935 ASSAY OF URINE OSMOLALITY: CPT

## 2021-06-13 PROCEDURE — 36415 COLL VENOUS BLD VENIPUNCTURE: CPT

## 2021-06-13 PROCEDURE — 74176 CT ABD & PELVIS W/O CONTRAST: CPT

## 2021-06-13 PROCEDURE — 86769 SARS-COV-2 COVID-19 ANTIBODY: CPT

## 2021-06-13 PROCEDURE — 82570 ASSAY OF URINE CREATININE: CPT

## 2021-06-13 PROCEDURE — 83036 HEMOGLOBIN GLYCOSYLATED A1C: CPT

## 2021-06-13 PROCEDURE — 99223 1ST HOSP IP/OBS HIGH 75: CPT

## 2021-06-13 PROCEDURE — 71045 X-RAY EXAM CHEST 1 VIEW: CPT | Mod: 26

## 2021-06-13 PROCEDURE — 82962 GLUCOSE BLOOD TEST: CPT

## 2021-06-13 PROCEDURE — 85027 COMPLETE CBC AUTOMATED: CPT

## 2021-06-13 PROCEDURE — 97116 GAIT TRAINING THERAPY: CPT | Mod: GP

## 2021-06-13 PROCEDURE — 97161 PT EVAL LOW COMPLEX 20 MIN: CPT | Mod: GP

## 2021-06-13 PROCEDURE — 80048 BASIC METABOLIC PNL TOTAL CA: CPT

## 2021-06-13 PROCEDURE — 83930 ASSAY OF BLOOD OSMOLALITY: CPT

## 2021-06-13 PROCEDURE — 74176 CT ABD & PELVIS W/O CONTRAST: CPT | Mod: 26

## 2021-06-13 PROCEDURE — 82533 TOTAL CORTISOL: CPT

## 2021-06-13 PROCEDURE — 84560 ASSAY OF URINE/URIC ACID: CPT

## 2021-06-13 RX ORDER — BACLOFEN 100 %
5 POWDER (GRAM) MISCELLANEOUS EVERY 6 HOURS
Refills: 0 | Status: DISCONTINUED | OUTPATIENT
Start: 2021-06-13 | End: 2021-06-15

## 2021-06-13 RX ORDER — PIPERACILLIN AND TAZOBACTAM 4; .5 G/20ML; G/20ML
3.38 INJECTION, POWDER, LYOPHILIZED, FOR SOLUTION INTRAVENOUS EVERY 8 HOURS
Refills: 0 | Status: DISCONTINUED | OUTPATIENT
Start: 2021-06-13 | End: 2021-06-15

## 2021-06-13 RX ORDER — FENTANYL CITRATE 50 UG/ML
1 INJECTION INTRAVENOUS
Refills: 0 | Status: DISCONTINUED | OUTPATIENT
Start: 2021-06-13 | End: 2021-06-13

## 2021-06-13 RX ORDER — SODIUM CHLORIDE 9 MG/ML
1000 INJECTION, SOLUTION INTRAVENOUS
Refills: 0 | Status: DISCONTINUED | OUTPATIENT
Start: 2021-06-13 | End: 2021-06-15

## 2021-06-13 RX ORDER — DEXTROSE 50 % IN WATER 50 %
15 SYRINGE (ML) INTRAVENOUS ONCE
Refills: 0 | Status: DISCONTINUED | OUTPATIENT
Start: 2021-06-13 | End: 2021-06-15

## 2021-06-13 RX ORDER — ENOXAPARIN SODIUM 100 MG/ML
40 INJECTION SUBCUTANEOUS EVERY 12 HOURS
Refills: 0 | Status: DISCONTINUED | OUTPATIENT
Start: 2021-06-13 | End: 2021-06-15

## 2021-06-13 RX ORDER — PIPERACILLIN AND TAZOBACTAM 4; .5 G/20ML; G/20ML
3.38 INJECTION, POWDER, LYOPHILIZED, FOR SOLUTION INTRAVENOUS ONCE
Refills: 0 | Status: COMPLETED | OUTPATIENT
Start: 2021-06-13 | End: 2021-06-13

## 2021-06-13 RX ORDER — SODIUM CHLORIDE 9 MG/ML
1000 INJECTION INTRAMUSCULAR; INTRAVENOUS; SUBCUTANEOUS
Refills: 0 | Status: DISCONTINUED | OUTPATIENT
Start: 2021-06-13 | End: 2021-06-15

## 2021-06-13 RX ORDER — LIPASE/PROTEASE/AMYLASE 16-48-48K
CAPSULE,DELAYED RELEASE (ENTERIC COATED) ORAL
Refills: 0 | Status: DISCONTINUED | OUTPATIENT
Start: 2021-06-13 | End: 2021-06-13

## 2021-06-13 RX ORDER — BACLOFEN 100 %
1 POWDER (GRAM) MISCELLANEOUS
Qty: 0 | Refills: 0 | DISCHARGE

## 2021-06-13 RX ORDER — POLYETHYLENE GLYCOL 3350 17 G/17G
17 POWDER, FOR SOLUTION ORAL EVERY 12 HOURS
Refills: 0 | Status: DISCONTINUED | OUTPATIENT
Start: 2021-06-13 | End: 2021-06-15

## 2021-06-13 RX ORDER — SIMETHICONE 80 MG/1
80 TABLET, CHEWABLE ORAL EVERY 6 HOURS
Refills: 0 | Status: DISCONTINUED | OUTPATIENT
Start: 2021-06-13 | End: 2021-06-15

## 2021-06-13 RX ORDER — OLANZAPINE 15 MG/1
2.5 TABLET, FILM COATED ORAL DAILY
Refills: 0 | Status: DISCONTINUED | OUTPATIENT
Start: 2021-06-13 | End: 2021-06-15

## 2021-06-13 RX ORDER — INSULIN LISPRO 100/ML
VIAL (ML) SUBCUTANEOUS
Refills: 0 | Status: DISCONTINUED | OUTPATIENT
Start: 2021-06-13 | End: 2021-06-15

## 2021-06-13 RX ORDER — DEXTROSE 50 % IN WATER 50 %
12.5 SYRINGE (ML) INTRAVENOUS ONCE
Refills: 0 | Status: DISCONTINUED | OUTPATIENT
Start: 2021-06-13 | End: 2021-06-15

## 2021-06-13 RX ORDER — ACETAMINOPHEN 500 MG
1000 TABLET ORAL EVERY 6 HOURS
Refills: 0 | Status: DISCONTINUED | OUTPATIENT
Start: 2021-06-13 | End: 2021-06-14

## 2021-06-13 RX ORDER — HYDROMORPHONE HYDROCHLORIDE 2 MG/ML
8 INJECTION INTRAMUSCULAR; INTRAVENOUS; SUBCUTANEOUS THREE TIMES A DAY
Refills: 0 | Status: DISCONTINUED | OUTPATIENT
Start: 2021-06-13 | End: 2021-06-15

## 2021-06-13 RX ORDER — DEXTROSE 50 % IN WATER 50 %
25 SYRINGE (ML) INTRAVENOUS ONCE
Refills: 0 | Status: DISCONTINUED | OUTPATIENT
Start: 2021-06-13 | End: 2021-06-15

## 2021-06-13 RX ORDER — GLUCAGON INJECTION, SOLUTION 0.5 MG/.1ML
1 INJECTION, SOLUTION SUBCUTANEOUS ONCE
Refills: 0 | Status: DISCONTINUED | OUTPATIENT
Start: 2021-06-13 | End: 2021-06-15

## 2021-06-13 RX ORDER — PANTOPRAZOLE SODIUM 20 MG/1
40 TABLET, DELAYED RELEASE ORAL DAILY
Refills: 0 | Status: DISCONTINUED | OUTPATIENT
Start: 2021-06-13 | End: 2021-06-15

## 2021-06-13 RX ORDER — LIPASE/PROTEASE/AMYLASE 16-48-48K
2 CAPSULE,DELAYED RELEASE (ENTERIC COATED) ORAL
Refills: 0 | Status: DISCONTINUED | OUTPATIENT
Start: 2021-06-13 | End: 2021-06-15

## 2021-06-13 RX ADMIN — Medication 1000 MILLIGRAM(S): at 23:41

## 2021-06-13 RX ADMIN — HYDROMORPHONE HYDROCHLORIDE 8 MILLIGRAM(S): 2 INJECTION INTRAMUSCULAR; INTRAVENOUS; SUBCUTANEOUS at 14:45

## 2021-06-13 RX ADMIN — Medication 1000 MILLIGRAM(S): at 15:55

## 2021-06-13 RX ADMIN — Medication 1000 MILLIGRAM(S): at 12:00

## 2021-06-13 RX ADMIN — Medication 5 MILLIGRAM(S): at 16:47

## 2021-06-13 RX ADMIN — PIPERACILLIN AND TAZOBACTAM 25 GRAM(S): 4; .5 INJECTION, POWDER, LYOPHILIZED, FOR SOLUTION INTRAVENOUS at 23:12

## 2021-06-13 RX ADMIN — ENOXAPARIN SODIUM 40 MILLIGRAM(S): 100 INJECTION SUBCUTANEOUS at 23:12

## 2021-06-13 RX ADMIN — Medication 0.5 MILLIGRAM(S): at 15:23

## 2021-06-13 RX ADMIN — Medication 0.5 MILLIGRAM(S): at 23:12

## 2021-06-13 RX ADMIN — SODIUM CHLORIDE 75 MILLILITER(S): 9 INJECTION INTRAMUSCULAR; INTRAVENOUS; SUBCUTANEOUS at 18:01

## 2021-06-13 RX ADMIN — Medication 2 CAPSULE(S): at 18:06

## 2021-06-13 NOTE — ED ADULT NURSE REASSESSMENT NOTE - NS ED NURSE REASSESS COMMENT FT1
Pt's hiccups resolved for about 1 hour but came back. 0.5 mg ativan IVP administered. Pt's son at bedside, pt comfortable in bed. Pt still tachycardic, MD Rodriguez aware, no new interventions at this time. Pt initially refusing tylenol, but now willing to take it.

## 2021-06-13 NOTE — ED PROVIDER NOTE - CARE PLAN
Principal Discharge DX:	Delirium  Secondary Diagnosis:	Fever, unspecified fever cause  Secondary Diagnosis:	Malignant neoplasm of pancreas, unspecified location of malignancy  Secondary Diagnosis:	Hyponatremia

## 2021-06-13 NOTE — CONSULT NOTE ADULT - SUBJECTIVE AND OBJECTIVE BOX
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 Patient presenting after having an outpatient FEVER .7 IN the ED recevied ZOSYN.   Patient also found to be Hyponatremic Na 125       [facility] covering physician Dr. Hendrickson has been contacted.     Care discussed with patient's son -  patient's family at this point going to continue with Gemcitabine. We discussed hospice -  patient's family wants to continue with Gemcitabine at this time. Patient is DNR, but not DNI as per son as his dad's request.   (13 Jun 2021 09:18)      PAST MEDICAL & SURGICAL HISTORY:  No pertinent past medical history    DM (diabetes mellitus)    No significant past surgical history        Allergies    No Known Allergies    Intolerances        MEDICATIONS  (STANDING):  acetaminophen    Suspension .. 1000 milliGRAM(s) Oral every 6 hours  dextrose 40% Gel 15 Gram(s) Oral once  dextrose 5%. 1000 milliLiter(s) (50 mL/Hr) IV Continuous <Continuous>  dextrose 5%. 1000 milliLiter(s) (100 mL/Hr) IV Continuous <Continuous>  dextrose 50% Injectable 25 Gram(s) IV Push once  dextrose 50% Injectable 12.5 Gram(s) IV Push once  dextrose 50% Injectable 25 Gram(s) IV Push once  enoxaparin Injectable 40 milliGRAM(s) SubCutaneous every 12 hours  glucagon  Injectable 1 milliGRAM(s) IntraMuscular once  insulin lispro (ADMELOG) corrective regimen sliding scale   SubCutaneous three times a day before meals  LORazepam     Tablet 0.5 milliGRAM(s) Oral three times a day  OLANZapine 2.5 milliGRAM(s) Oral daily  pancrelipase Oral Capsule (CREON  3,000 Lipase Units) - Peds  Capsule(s) Oral   pantoprazole    Tablet 40 milliGRAM(s) Oral daily  piperacillin/tazobactam IVPB.. 3.375 Gram(s) IV Intermittent every 8 hours  polyethylene glycol 3350 17 Gram(s) Oral every 12 hours    MEDICATIONS  (PRN):  baclofen 5 milliGRAM(s) Oral every 6 hours PRN Hiccups  HYDROmorphone   Tablet 8 milliGRAM(s) Oral three times a day PRN Severe Pain (7 - 10)  simethicone 80 milliGRAM(s) Chew every 6 hours PRN Gas      FAMILY HISTORY:  Family history of stroke (Mother)        SOCIAL HISTORY: No EtOH, no tobacco    REVIEW OF SYSTEMS:    CONSTITUTIONAL: No weakness, fevers or chills  EYES/ENT: No visual changes;  No vertigo or throat pain   NECK: No pain or stiffness  RESPIRATORY: No cough, wheezing, hemoptysis; No shortness of breath  CARDIOVASCULAR: No chest pain or palpitations  GASTROINTESTINAL: No abdominal or epigastric pain. No nausea, vomiting, or hematemesis; No diarrhea or constipation. No melena or hematochezia.  GENITOURINARY: No dysuria, frequency or hematuria  NEUROLOGICAL: No numbness or weakness  SKIN: No itching, burning, rashes, or lesions   All other review of systems is negative unless indicated above.    Height (cm): 167.6 (06-13 @ 03:27)    T(F): 99.1 (06-13-21 @ 07:10), Max: 99.2 (06-13-21 @ 03:27)  HR: 115 (06-13-21 @ 07:10)  BP: 127/75 (06-13-21 @ 07:10)  RR: 16 (06-13-21 @ 07:10)  SpO2: 95% (06-13-21 @ 07:10)  Wt(kg): --    GENERAL: NAD, well-developed  HEAD:  Atraumatic, Normocephalic  EYES: EOMI, PERRLA, conjunctiva and sclera clear  NECK: Supple, No JVD  CHEST/LUNG: Clear to auscultation bilaterally; No wheeze  HEART: Regular rate and rhythm; No murmurs, rubs, or gallops  ABDOMEN: Soft, Nontender, Nondistended; Bowel sounds present  EXTREMITIES:  2+ Peripheral Pulses, No clubbing, cyanosis, or edema  NEUROLOGY: non-focal  SKIN: No rashes or lesions                          9.2    13.74 )-----------( 258      ( 13 Jun 2021 04:20 )             29.0       06-13    125<L>  |  93<L>  |  12  ----------------------------<  94  4.0   |  26  |  0.55    Ca    8.3<L>      13 Jun 2021 04:20  Mg     1.8     06-13    TPro  7.2  /  Alb  2.8<L>  /  TBili  0.3  /  DBili  x   /  AST  22  /  ALT  21  /  AlkPhos  109  06-13      Magnesium, Serum: 1.8 mg/dL (06-13 @ 04:20)      PT/INR - ( 13 Jun 2021 04:20 )   PT: 13.6 sec;   INR: 1.18 ratio         PTT - ( 13 Jun 2021 04:20 )  PTT:26.2 sec     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 back in February 2021 the patient was found to have 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 Patient presenting after having an outpatient FEVER .7 IN the ED recevied ZOSYN.   Patient also found to be Hyponatremic Na 125     . Patient is DNR, but not DNI as per son as his dad's request.   (13 Jun 2021 09:18)      PAST MEDICAL & SURGICAL HISTORY:  No pertinent past medical history    DM (diabetes mellitus)    No significant past surgical history        Allergies    No Known Allergies    Intolerances        MEDICATIONS  (STANDING):  acetaminophen    Suspension .. 1000 milliGRAM(s) Oral every 6 hours  dextrose 40% Gel 15 Gram(s) Oral once  dextrose 5%. 1000 milliLiter(s) (50 mL/Hr) IV Continuous <Continuous>  dextrose 5%. 1000 milliLiter(s) (100 mL/Hr) IV Continuous <Continuous>  dextrose 50% Injectable 25 Gram(s) IV Push once  dextrose 50% Injectable 12.5 Gram(s) IV Push once  dextrose 50% Injectable 25 Gram(s) IV Push once  enoxaparin Injectable 40 milliGRAM(s) SubCutaneous every 12 hours  glucagon  Injectable 1 milliGRAM(s) IntraMuscular once  insulin lispro (ADMELOG) corrective regimen sliding scale   SubCutaneous three times a day before meals  LORazepam     Tablet 0.5 milliGRAM(s) Oral three times a day  OLANZapine 2.5 milliGRAM(s) Oral daily  pancrelipase Oral Capsule (CREON  3,000 Lipase Units) - Peds  Capsule(s) Oral   pantoprazole    Tablet 40 milliGRAM(s) Oral daily  piperacillin/tazobactam IVPB.. 3.375 Gram(s) IV Intermittent every 8 hours  polyethylene glycol 3350 17 Gram(s) Oral every 12 hours    MEDICATIONS  (PRN):  baclofen 5 milliGRAM(s) Oral every 6 hours PRN Hiccups  HYDROmorphone   Tablet 8 milliGRAM(s) Oral three times a day PRN Severe Pain (7 - 10)  simethicone 80 milliGRAM(s) Chew every 6 hours PRN Gas      FAMILY HISTORY:  Family history of stroke (Mother)        SOCIAL HISTORY: No EtOH, no tobacco    REVIEW OF SYSTEMS:    CONSTITUTIONAL: No weakness, fevers or chills  EYES/ENT: No visual changes;  No vertigo or throat pain   NECK: No pain or stiffness  RESPIRATORY: No cough, wheezing, hemoptysis; No shortness of breath  CARDIOVASCULAR: No chest pain or palpitations  GASTROINTESTINAL: No abdominal or epigastric pain. No nausea, vomiting, or hematemesis; No diarrhea or constipation. No melena or hematochezia.  GENITOURINARY: No dysuria, frequency or hematuria  NEUROLOGICAL: No numbness or weakness  SKIN: No itching, burning, rashes, or lesions   All other review of systems is negative unless indicated above.    Height (cm): 167.6 (06-13 @ 03:27)    T(F): 99.1 (06-13-21 @ 07:10), Max: 99.2 (06-13-21 @ 03:27)  HR: 115 (06-13-21 @ 07:10)  BP: 127/75 (06-13-21 @ 07:10)  RR: 16 (06-13-21 @ 07:10)  SpO2: 95% (06-13-21 @ 07:10)  Wt(kg): --    GENERAL: NAD, well-developed  HEAD:  Atraumatic, Normocephalic  EYES: EOMI, PERRLA, conjunctiva and sclera clear  NECK: Supple, No JVD  CHEST/LUNG: Clear to auscultation bilaterally; No wheeze  HEART: Regular rate and rhythm; No murmurs, rubs, or gallops  ABDOMEN: Soft, Nontender, Nondistended; Bowel sounds present  EXTREMITIES:  2+ Peripheral Pulses, No clubbing, cyanosis, or edema  NEUROLOGY: non-focal  SKIN: No rashes or lesions                          9.2    13.74 )-----------( 258      ( 13 Jun 2021 04:20 )             29.0       06-13    125<L>  |  93<L>  |  12  ----------------------------<  94  4.0   |  26  |  0.55    Ca    8.3<L>      13 Jun 2021 04:20  Mg     1.8     06-13    TPro  7.2  /  Alb  2.8<L>  /  TBili  0.3  /  DBili  x   /  AST  22  /  ALT  21  /  AlkPhos  109  06-13      Magnesium, Serum: 1.8 mg/dL (06-13 @ 04:20)      PT/INR - ( 13 Jun 2021 04:20 )   PT: 13.6 sec;   INR: 1.18 ratio         PTT - ( 13 Jun 2021 04:20 )  PTT:26.2 sec

## 2021-06-13 NOTE — H&P ADULT - PROBLEM SELECTOR PLAN 2
- malignancy vs Infectious process related in an immunocompromised host  - WBC is elevated   - lactate normal  - will hydrate patient -  as patient is profusely sweating  - will give initial dose of IV antibiotics as patient is immunocompromised and might not manifesting clear signs of infection  - ID evaluation - malignancy vs Infectious process related in an immunocompromised host  - WBC is elevated   - lactate normal  - pan culture -  pending blood cultures results  - will hydrate patient -  as patient is profusely sweating   - will give initial dose of IV antibiotics as patient is immunocompromised and might not manifesting clear signs of infection  - ID evaluation

## 2021-06-13 NOTE — PHARMACOTHERAPY INTERVENTION NOTE - COMMENTS
called ED to confirm patient's dose of Creon at home, patient's son confirmed home dose of Creon 12,000 units 2 capsules with each meal

## 2021-06-13 NOTE — ED ADULT NURSE NOTE - OBJECTIVE STATEMENT
Pt ambulatory to ED with son c/o fever and AMS. Pt PMH pancreatic cancer, treatment at [facility] [place] [facility] with last chemo on Friday. As per son, pt was fine all day on Saturday and then late at night/early morning Sunday pt appeared altered to family and spike a fever. Family called [facility] [place] [facility] who told pt to come to ER for further eval due to the AMS. Pt now alert and oriented, with hiccups who states he frequently gets hiccups and takes baclofen for them. Pt tachycardic upon arrival, no respiratory distress, no chest pain. Pt has 125 mcg fentanyl patch (48 hour dose) for chronic pain. No vomiting/diarrhea.

## 2021-06-13 NOTE — ED ADULT NURSE NOTE - CAS TRG GEN SKIN COLOR
Attempted to contact the patient; message received that his voice mailbox has not yet been set up.    Normal for race/Pink

## 2021-06-13 NOTE — H&P ADULT - PROBLEM SELECTOR PLAN 1
- slowly resolving  - this could be secondary to dehydration /  pain medications /  underlying malignancy /  infectious etiology /  electrolyte abnormalities  - IV hydration  -As patient is sweating -  I stopped fentanyl as its absorption increases and it might play a role in patient's change in mentation.  - IV zosyn x 1 in the ED

## 2021-06-13 NOTE — ED PROVIDER NOTE - OBJECTIVE STATEMENT
Pt. is a 64 yo M with hx of pancreatic cancer, hyponatremia, DM, GERD presents with AMS since last night.  Son states patient was confused, not making sense.  They checked his glucose which was in the 120s and his vital signs which were normal.  Through the night, patient began having hiccups and got a fever.  Family called his doctor who told them to bring him to the ED.  Patient has fentanyl patch for pain and oral pain meds PRN.  Patient has been having some abdominal discomfort and accumulation of fluid with distension of abdomen.  No vomiting or diarrhea.

## 2021-06-13 NOTE — H&P ADULT - PROBLEM SELECTOR PLAN 3
- patient currently on palliative regiment of Gemcitabine  - as per patient's son -  he has a pancreatic stent  - currently being followed out by [facility]

## 2021-06-13 NOTE — H&P ADULT - NSHPREVIEWOFSYSTEMS_GEN_ALL_CORE
REVIEW OF SYSTEMS:  General: NAD, hemodynamically stable, + weakness  HEENT:  Eyes:  No visual loss, blurred vision, double vision or yellow sclerae. Ears, Nose, Throat:  No hearing loss, sneezing, congestion, runny nose or sore throat.  SKIN:  No rash or itching.  CARDIOVASCULAR:  No chest pain  RESPIRATORY:  No shortness of breath   GASTROINTESTINAL:  No anorexia, nausea, vomiting or diarrhea   NEUROLOGICAL:  No headache, dizziness   MUSCULOSKELETAL:  No muscle, back pain   HEMATOLOGIC:  + Anemia  LYMPHATICS:  No enlarged nodes. No history of splenectomy.  ENDOCRINOLOGIC:  No reports of sweating, cold or heat intolerance. No polyuria or polydipsia.  ALLERGIES:  No history of asthma, hives, eczema or rhinitis.

## 2021-06-13 NOTE — H&P ADULT - NSHPPHYSICALEXAM_GEN_ALL_CORE
Physical Exam:   GENERAL APPEARANCE:  Very deconditioned, frail, sick  T(C): 37.3 (06-13-21 @ 07:10), Max: 37.3 (06-13-21 @ 03:27)  HR: 115 (06-13-21 @ 07:10) (110 - 125)  BP: 127/75 (06-13-21 @ 07:10) (100/60 - 147/85)  RR: 16 (06-13-21 @ 07:10) (16 - 20)  SpO2: 95% (06-13-21 @ 07:10) (95% - 99%)  HEENT:  dry mucosal membrane  Skin:  thin dry  NECK:  Supple without lymphadenopathy.   HEART:  Regular rate and rhythm. normal S1 and S2, No M/R/G  LUNGS:  Good ins/exp effort, no W/R/R/C  ABDOMEN:  Soft, nontender, nondistended with good bowel sounds heard  EXTREMITIES:  thin  NEUROLOGICAL:  Maintains normal conversation

## 2021-06-13 NOTE — H&P ADULT - CONVERSATION DETAILS
As per son - family is aware of dad's overall poor prognosis. They understand that dad is getting Gemcitobine as a palliative regiment. They have had hospice discussion with primary oncologist, right now they are waiting for course of Gemcitobine to be completed.     Dad has expressed to the son - that he wants to be intubated if needed, but DNR

## 2021-06-13 NOTE — H&P ADULT - HISTORY OF PRESENT ILLNESS
Pt. is a 66 yo M with hx of pancreatic cancer (on Gemcitabine), hyponatremia (in the past 128), DM (on metformin), GERD presented in the hospital with AMS since last night. According to the son - who is patient's healthcare proxy, dad was doing well yesterday, but throughout the day he became more confused and forgetful - not making sense. Patient's care was discussed with outpatient doctor - who noted that if symptoms to continue to come in the hospital. Patient after the initial confusion, went to sleep, but woke up 3 am - with night sweats, fevers, hiccups and continued to be confused. Patient was brought in the hospital. In the hospital patient appears to be very comfortable but is sweating. When woken up -  patient almost immediately started hiccups. Patient appears to be comfortable. Denies any HA, CP, SOB. Some abdominal discomfort. Care discussed with patient's son. We went over all the lab work.     [facility] [place] [facility] covering physician Dr. Hendrickson has been contacted.     Care discussed with patient's son -  patient's family at this point going to continue with Gemcitabine. We discussed hospice -  patient's family wants to continue with Gemcitabine at this time. Patient is DNR, but not DNI as per son as his dad's request.   Pt. 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 According to the son - who is patient's healthcare proxy, dad was doing well yesterday, but throughout the day he became more confused and forgetful - not making sense. Patient's care was discussed with outpatient doctor - who noted that if symptoms to continue to come in the hospital. Patient after the initial confusion, went to sleep, but woke up 3 am - with night sweats, fevers, hiccups and continued to be confused. Patient was brought in the hospital. In the hospital patient appears to be very comfortable but is sweating. When woken up -  patient almost immediately started hiccups. Patient appears to be comfortable. Denies any HA, CP, SOB. Some abdominal discomfort. Care discussed with patient's son. We went over all the lab work.     [facility] covering physician Dr. Hendrickson has been contacted.     Care discussed with patient's son -  patient's family at this point going to continue with Gemcitabine. We discussed hospice -  patient's family wants to continue with Gemcitabine at this time. Patient is DNR, but not DNI as per son as his dad's request.

## 2021-06-13 NOTE — H&P ADULT - NSHPLABSRESULTS_GEN_ALL_CORE
CBC Full  -  ( 2021 04:20 )  WBC Count : 13.74 K/uL  RBC Count : 3.66 M/uL  Hemoglobin : 9.2 g/dL  Hematocrit : 29.0 %  Platelet Count - Automated : 258 K/uL  Mean Cell Volume : 79.2 fl  Mean Cell Hemoglobin : 25.1 pg  Mean Cell Hemoglobin Concentration : 31.7 gm/dL  Auto Neutrophil # : 11.56 K/uL  Auto Lymphocyte # : 1.71 K/uL  Auto Monocyte # : 0.24 K/uL  Auto Eosinophil # : 0.06 K/uL  Auto Basophil # : 0.09 K/uL  Auto Neutrophil % : 84.2 %  Auto Lymphocyte % : 12.4 %  Auto Monocyte % : 1.7 %  Auto Eosinophil % : 0.4 %  Auto Basophil % : 0.7 %    PT/INR - ( 2021 04:20 )   PT: 13.6 sec;   INR: 1.18 ratio         PTT - ( 2021 04:20 )  PTT:26.2 sec  Urinalysis Basic - ( 2021 05:40 )    Color: Yellow / Appearance: Clear / S.010 / pH: x  Gluc: x / Ketone: Negative  / Bili: Negative / Urobili: Negative mg/dL   Blood: x / Protein: Negative mg/dL / Nitrite: Negative   Leuk Esterase: Negative / RBC: x / WBC x   Sq Epi: x / Non Sq Epi: x / Bacteria: x          125<L>  |  93<L>  |  12  ----------------------------<  94  4.0   |  26  |  0.55    Ca    8.3<L>      2021 04:20  Mg     1.8         TPro  7.2  /  Alb  2.8<L>  /  TBili  0.3  /  DBili  x   /  AST  22  /  ALT  21  /  AlkPhos  109

## 2021-06-13 NOTE — ED PROVIDER NOTE - SECONDARY DIAGNOSIS.
Fever, unspecified fever cause Malignant neoplasm of pancreas, unspecified location of malignancy Hyponatremia

## 2021-06-14 LAB
A1C WITH ESTIMATED AVERAGE GLUCOSE RESULT: 6.6 % — HIGH (ref 4–5.6)
ADD ON TEST-SPECIMEN IN LAB: SIGNIFICANT CHANGE UP
ANION GAP SERPL CALC-SCNC: 7 MMOL/L — SIGNIFICANT CHANGE UP (ref 5–17)
BUN SERPL-MCNC: 9 MG/DL — SIGNIFICANT CHANGE UP (ref 7–23)
CALCIUM SERPL-MCNC: 8.4 MG/DL — LOW (ref 8.5–10.1)
CHLORIDE SERPL-SCNC: 97 MMOL/L — SIGNIFICANT CHANGE UP (ref 96–108)
CO2 SERPL-SCNC: 25 MMOL/L — SIGNIFICANT CHANGE UP (ref 22–31)
COVID-19 SPIKE DOMAIN AB INTERP: POSITIVE
COVID-19 SPIKE DOMAIN ANTIBODY RESULT: >250 U/ML — HIGH
CREAT SERPL-MCNC: 0.47 MG/DL — LOW (ref 0.5–1.3)
ESTIMATED AVERAGE GLUCOSE: 143 MG/DL — HIGH (ref 68–114)
GLUCOSE SERPL-MCNC: 134 MG/DL — HIGH (ref 70–99)
HCT VFR BLD CALC: 28.1 % — LOW (ref 39–50)
HGB BLD-MCNC: 9 G/DL — LOW (ref 13–17)
MCHC RBC-ENTMCNC: 25.6 PG — LOW (ref 27–34)
MCHC RBC-ENTMCNC: 32 GM/DL — SIGNIFICANT CHANGE UP (ref 32–36)
MCV RBC AUTO: 80.1 FL — SIGNIFICANT CHANGE UP (ref 80–100)
PLATELET # BLD AUTO: 220 K/UL — SIGNIFICANT CHANGE UP (ref 150–400)
POTASSIUM SERPL-MCNC: 3.6 MMOL/L — SIGNIFICANT CHANGE UP (ref 3.5–5.3)
POTASSIUM SERPL-SCNC: 3.6 MMOL/L — SIGNIFICANT CHANGE UP (ref 3.5–5.3)
RBC # BLD: 3.51 M/UL — LOW (ref 4.2–5.8)
RBC # FLD: 17.2 % — HIGH (ref 10.3–14.5)
SARS-COV-2 IGG+IGM SERPL QL IA: >250 U/ML — HIGH
SARS-COV-2 IGG+IGM SERPL QL IA: POSITIVE
SODIUM SERPL-SCNC: 129 MMOL/L — LOW (ref 135–145)
WBC # BLD: 10.63 K/UL — HIGH (ref 3.8–10.5)
WBC # FLD AUTO: 10.63 K/UL — HIGH (ref 3.8–10.5)

## 2021-06-14 PROCEDURE — 99497 ADVNCD CARE PLAN 30 MIN: CPT | Mod: 25

## 2021-06-14 PROCEDURE — 99233 SBSQ HOSP IP/OBS HIGH 50: CPT

## 2021-06-14 RX ORDER — ACETAMINOPHEN 500 MG
1000 TABLET ORAL EVERY 6 HOURS
Refills: 0 | Status: DISCONTINUED | OUTPATIENT
Start: 2021-06-14 | End: 2021-06-15

## 2021-06-14 RX ORDER — FENTANYL CITRATE 50 UG/ML
1 INJECTION INTRAVENOUS
Refills: 0 | Status: DISCONTINUED | OUTPATIENT
Start: 2021-06-14 | End: 2021-06-15

## 2021-06-14 RX ADMIN — SODIUM CHLORIDE 75 MILLILITER(S): 9 INJECTION INTRAMUSCULAR; INTRAVENOUS; SUBCUTANEOUS at 17:46

## 2021-06-14 RX ADMIN — Medication 1: at 17:47

## 2021-06-14 RX ADMIN — PIPERACILLIN AND TAZOBACTAM 25 GRAM(S): 4; .5 INJECTION, POWDER, LYOPHILIZED, FOR SOLUTION INTRAVENOUS at 13:35

## 2021-06-14 RX ADMIN — FENTANYL CITRATE 1 PATCH: 50 INJECTION INTRAVENOUS at 12:44

## 2021-06-14 RX ADMIN — POLYETHYLENE GLYCOL 3350 17 GRAM(S): 17 POWDER, FOR SOLUTION ORAL at 10:05

## 2021-06-14 RX ADMIN — ENOXAPARIN SODIUM 40 MILLIGRAM(S): 100 INJECTION SUBCUTANEOUS at 22:33

## 2021-06-14 RX ADMIN — PIPERACILLIN AND TAZOBACTAM 25 GRAM(S): 4; .5 INJECTION, POWDER, LYOPHILIZED, FOR SOLUTION INTRAVENOUS at 05:18

## 2021-06-14 RX ADMIN — Medication 1000 MILLIGRAM(S): at 05:18

## 2021-06-14 RX ADMIN — Medication 1000 MILLIGRAM(S): at 23:12

## 2021-06-14 RX ADMIN — OLANZAPINE 2.5 MILLIGRAM(S): 15 TABLET, FILM COATED ORAL at 10:05

## 2021-06-14 RX ADMIN — PIPERACILLIN AND TAZOBACTAM 25 GRAM(S): 4; .5 INJECTION, POWDER, LYOPHILIZED, FOR SOLUTION INTRAVENOUS at 22:33

## 2021-06-14 RX ADMIN — ENOXAPARIN SODIUM 40 MILLIGRAM(S): 100 INJECTION SUBCUTANEOUS at 10:05

## 2021-06-14 RX ADMIN — POLYETHYLENE GLYCOL 3350 17 GRAM(S): 17 POWDER, FOR SOLUTION ORAL at 22:33

## 2021-06-14 RX ADMIN — PANTOPRAZOLE SODIUM 40 MILLIGRAM(S): 20 TABLET, DELAYED RELEASE ORAL at 10:05

## 2021-06-14 RX ADMIN — FENTANYL CITRATE 1 PATCH: 50 INJECTION INTRAVENOUS at 20:00

## 2021-06-14 RX ADMIN — HYDROMORPHONE HYDROCHLORIDE 8 MILLIGRAM(S): 2 INJECTION INTRAMUSCULAR; INTRAVENOUS; SUBCUTANEOUS at 15:57

## 2021-06-14 RX ADMIN — SODIUM CHLORIDE 75 MILLILITER(S): 9 INJECTION INTRAMUSCULAR; INTRAVENOUS; SUBCUTANEOUS at 05:24

## 2021-06-14 RX ADMIN — Medication 2 CAPSULE(S): at 17:47

## 2021-06-14 RX ADMIN — Medication 1: at 11:37

## 2021-06-14 RX ADMIN — Medication 1000 MILLIGRAM(S): at 10:02

## 2021-06-14 RX ADMIN — Medication 2 CAPSULE(S): at 13:35

## 2021-06-14 RX ADMIN — Medication 2 CAPSULE(S): at 10:03

## 2021-06-14 RX ADMIN — Medication 0.5 MILLIGRAM(S): at 05:17

## 2021-06-14 RX ADMIN — OLANZAPINE 2.5 MILLIGRAM(S): 15 TABLET, FILM COATED ORAL at 01:16

## 2021-06-14 NOTE — PROVIDER CONTACT NOTE (CHANGE IN STATUS NOTIFICATION) - SITUATION
Patient awoke drenched in sweat. Afebrile. Tachycardic (has been tachy all day). Other vitals WNL. Blood glucose stable.

## 2021-06-14 NOTE — PROGRESS NOTE ADULT - CONVERSATION DETAILS
Son Talisha at bedside is the HCP - pt's wife is alternate  we discussed that patient has inoperable ca with mets  son relays that they have in the past had this discussion   at this time, he thinks his dad would not want to be resuscitated or intubated   Patient would prefer natural death if his heart stopped or if he was unable to breathe  MOLST form was signed - patient is DNR and DNI   RN at bedside Hope, witnessed this conversation

## 2021-06-14 NOTE — CONSULT NOTE ADULT - ASSESSMENT
64 yo male with hx of recent dx pancreatic ca in Feb 2021 and treated with gemcitabine and recent AMS   Hx of Hyponatremia in May and now returns with c/p AMS and poor po intake with hiccups and nausea    Hyponatremia  improving after IVF Saline - hypovolemia??    vs SIADH with Nausea, and Abd pains , vs bladder distension    check urine lytes, serum osm, uric acid, AM cortisol in AM    continue IVF saline till po at goal    monitor Na trend   monitor  AMS with correction - avoid rapid correction    nausea and pain control    distended bladder on CT - check bladder scan for retention      d/w family at bedside at length     Thank you for the courtesy of this consult. We will follow this patient with you.   Management is subject to change if new information becomes available or patient condition changes.  
66 yo male with history o f metastatic pancreatic CA on GEM ABRAXANE admitted for FEVER, sepsis     FEVER- Severe sepsis likely secondary to GI source   - continue with ZOSYN   - possible Drug induced fever yet will need to await cultures   - follow up Blood cultures     Pancreatic CA   - s/p GEM ABRAXANE on 6/11   - most recent CT from 6/9 suggest POD with Hepatic mets hence rationale for transitioning from mFOLFIRINOX to GEM ABRAXANE    Hyponatremia   - check serum and urine osm   - possible secondary to hypovolemia   - continue with IVF     DVTppx: continue with Lovenox   
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 According to the son - who is patient's healthcare proxy, dad was doing well the day PTA, but throughout the day he became more confused and forgetful - not making sense. Patient after the initial confusion, went to sleep, but woke up 3 am - with night sweats, fevers, hiccups and continued to be confused. In ER he was noted with hiccups and low grade fever. Concern for an infectious process was raised. In ER he received zosyn.     1. Low grade fever. Encephalopathy. Unresectable pancreatic Ca on gemcitabine. Peritoneal carcinomatosis.   -possible sepsis vs tumor fever  -obtain BC x 2  -agree with zosyn 3.375 gm IV q8h  -reason for abx use and side effects reviewed with patient; monitor BMP  -old chart reviewed to assess prior cultures  -monitor temps  -f/u CBC  -supportive care  2. Other issues:   -care per medicine

## 2021-06-14 NOTE — CONSULT NOTE ADULT - SUBJECTIVE AND OBJECTIVE BOX
** pt seen earlier today   d/w caleb at bedside  Pt. 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 According to the son - father was doing well until day after chemo ay he became more confused and forgetful  pt was having night sweats, fevers, hiccups and continued to be confused.   Na noted to be 125 in ED last night  - today 129   pt appears to be comfortable. Denies any HA, CP, SOB. Some abdominal discomfort.no hiccups  Family states hyponatremia at [facility] was treated with IV saline as well and improved   pt had been drinking primarily Gatorade at home before admission     today pt is weak appearing and awake    Denies nausea but + abd pains    poor po intake    drinking Gatorage         PAST MEDICAL & SURGICAL HISTORY:  No pertinent past medical history    DM (diabetes mellitus)    No significant past surgical history    Home Medications:  acetaminophen 160 mg/5 mL oral suspension: 31.25 milliliter(s) orally every 6 hours, As needed, Mild Pain (1 - 3), Moderate Pain (4 - 6) (2021 10:38)  baclofen 5 mg oral tablet: 1 tab(s) orally 3 times a day (2021 09:36)  famotidine 20 mg oral tablet: 1 tab(s) orally 2 times a day (2021 10:38)  LORazepam 0.5 mg oral tablet: 1 tab(s) orally 3 times a day (2021 09:36)  metFORMIN 500 mg oral tablet: 1 tab(s) orally 2 times a day (2021 10:38)    MEDICATIONS  (STANDING):  acetaminophen   Tablet .. 1000 milliGRAM(s) Oral every 6 hours  dextrose 40% Gel 15 Gram(s) Oral once  dextrose 5%. 1000 milliLiter(s) (50 mL/Hr) IV Continuous <Continuous>  dextrose 5%. 1000 milliLiter(s) (100 mL/Hr) IV Continuous <Continuous>  dextrose 50% Injectable 25 Gram(s) IV Push once  dextrose 50% Injectable 12.5 Gram(s) IV Push once  dextrose 50% Injectable 25 Gram(s) IV Push once  enoxaparin Injectable 40 milliGRAM(s) SubCutaneous every 12 hours  fentaNYL   Patch 100 MICROgram(s)/Hr. 1 Patch Transdermal every 48 hours  glucagon  Injectable 1 milliGRAM(s) IntraMuscular once  insulin lispro (ADMELOG) corrective regimen sliding scale   SubCutaneous three times a day before meals  LORazepam     Tablet 0.5 milliGRAM(s) Oral three times a day  OLANZapine 2.5 milliGRAM(s) Oral daily  pancrelipase  (CREON 12,000 Lipase Units) 2 Capsule(s) Oral three times a day with meals  pantoprazole    Tablet 40 milliGRAM(s) Oral daily  piperacillin/tazobactam IVPB.. 3.375 Gram(s) IV Intermittent every 8 hours  polyethylene glycol 3350 17 Gram(s) Oral every 12 hours  sodium chloride 0.9%. 1000 milliLiter(s) (75 mL/Hr) IV Continuous <Continuous>      Allergies    No Known Allergies    Intolerances        SOCIAL HISTORY:  Denies ETOh,Smoking,     FAMILY HISTORY:  Family history of stroke (Mother)        REVIEW OF SYSTEMS:    CONSTITUTIONAL: No weakness, fevers or chills  EYES/ENT: No visual changes;  No vertigo or throat pain   NECK: No pain or stiffness  RESPIRATORY: No cough, wheezing, hemoptysis; No shortness of breath  CARDIOVASCULAR: No chest pain or palpitations  GASTROINTESTINAL: No abdominal or epigastric pain. No nausea, vomiting, or hematemesis; No diarrhea or constipation. No melena or hematochezia.  GENITOURINARY: No dysuria, frequency or hematuria  NEUROLOGICAL: No numbness or weakness  SKIN: No itching, burning, rashes, or lesions   All other review of systems is negative unless indicated above.    VITAL:  Vital Signs Last 24 Hrs  T(C): 36.4 (2021 15:59), Max: 37.2 (2021 05:09)  T(F): 97.5 (2021 15:59), Max: 98.9 (2021 05:09)  HR: 105 (2021 15:59) (96 - 113)  BP: 123/67 (2021 15:59) (104/50 - 123/67)  BP(mean): --  RR: 18 (2021 15:59) (18 - 18)  SpO2: 98% (2021 15:59) (98% - 99%)    I and O's:    06-13 @ 07:  -   @ 07:00  --------------------------------------------------------  IN: 800 mL / OUT: 0 mL / NET: 800 mL     @ 07: @ 19:31  --------------------------------------------------------  IN: 1120 mL / OUT: 0 mL / NET: 1120 mL          PHYSICAL EXAM:    Constitutional: NAD  HEENT: CHASTITY, EOMI,  MMM  Neck: No LAD, No JVD  Respiratory: CTAB  Cardiovascular: S1 and S2  Gastrointestinal: BS+, soft, NT/ND  Extremities: No peripheral edema  Neurological: A/O x 3, no focal deficits  : No [place]  Skin: No rashes  Access: Not applicable    LABS:                 129    |  97     |  9      ----------------------------<  134       2021 06:51  3.6     |  25     |  0.47     125    |  93     |  12     ----------------------------<  94        2021 04:20  4.0     |  26     |  0.55     Ca    8.4        2021 06:51  Ca    8.3        2021 04:20      Mg     1.8       2021 04:20    TPro  7.2    /  Alb  2.8    /  TBili  0.3    /        2021 04:20  DBili  x      /  AST  22     /  ALT  21     /  AlkPhos  109                     9.0    10.63 )-----------( 220      ( 2021 06:51 )             28.1         Ca    8.4<L>      2021 06:51  Mg     1.8     06-    TPro  7.2  /  Alb  2.8<L>  /  TBili  0.3  /  DBili  x   /  AST  22  /  ALT  21  /  AlkPhos  109  06-13      Urine Studies:  Urinalysis Basic - ( 2021 05:40 )    Color: Yellow / Appearance: Clear / S.010 / pH: x  Gluc: x / Ketone: Negative  / Bili: Negative / Urobili: Negative mg/dL   Blood: x / Protein: Negative mg/dL / Nitrite: Negative   Leuk Esterase: Negative / RBC: x / WBC x   Sq Epi: x / Non Sq Epi: x / Bacteria: x            RADIOLOGY & ADDITIONAL STUDIES:

## 2021-06-14 NOTE — CONSULT NOTE ADULT - SUBJECTIVE AND OBJECTIVE BOX
Patient is a 65y old  Male who presents with a chief complaint of AMS /  Fevers /  Hiccups     HPI:  66 y/o Male with h/o unresectable T4Nx Mo adenocarcinoma of the pancreatic head with signet ring features diagnosed in 2021 (currently on Gemcitabine), hyponatremia, DM (on metformin), GERD was admitted on  for increased confusion x one day. According to the son - who is patient's healthcare proxy, dad was doing well the day PTA, but throughout the day he became more confused and forgetful - not making sense. Patient after the initial confusion, went to sleep, but woke up 3 am - with night sweats, fevers, hiccups and continued to be confused. In ER he was noted with hiccups and low grade fever. Concern for an infectious process was raised. In ER he received zosyn.     Patient is DNR, but not DNI as per son as his dad's request.     PMH: as above  PSH: as above  Meds: per reconciliation sheet, noted below  MEDICATIONS  (STANDING):  acetaminophen   Tablet .. 1000 milliGRAM(s) Oral every 6 hours  dextrose 40% Gel 15 Gram(s) Oral once  dextrose 5%. 1000 milliLiter(s) (50 mL/Hr) IV Continuous <Continuous>  dextrose 5%. 1000 milliLiter(s) (100 mL/Hr) IV Continuous <Continuous>  dextrose 50% Injectable 25 Gram(s) IV Push once  dextrose 50% Injectable 12.5 Gram(s) IV Push once  dextrose 50% Injectable 25 Gram(s) IV Push once  enoxaparin Injectable 40 milliGRAM(s) SubCutaneous every 12 hours  glucagon  Injectable 1 milliGRAM(s) IntraMuscular once  insulin lispro (ADMELOG) corrective regimen sliding scale   SubCutaneous three times a day before meals  LORazepam     Tablet 0.5 milliGRAM(s) Oral three times a day  OLANZapine 2.5 milliGRAM(s) Oral daily  pancrelipase  (CREON 12,000 Lipase Units) 2 Capsule(s) Oral three times a day with meals  pantoprazole    Tablet 40 milliGRAM(s) Oral daily  piperacillin/tazobactam IVPB.. 3.375 Gram(s) IV Intermittent every 8 hours  polyethylene glycol 3350 17 Gram(s) Oral every 12 hours  sodium chloride 0.9%. 1000 milliLiter(s) (75 mL/Hr) IV Continuous <Continuous>    MEDICATIONS  (PRN):  baclofen 5 milliGRAM(s) Oral every 6 hours PRN Hiccups  HYDROmorphone   Tablet 8 milliGRAM(s) Oral three times a day PRN Severe Pain (7 - 10)  simethicone 80 milliGRAM(s) Chew every 6 hours PRN Gas    Allergies    No Known Allergies    Intolerances      Social: no smoking, no alcohol, no illegal drugs; no recent travel, no exposure to TB  FAMILY HISTORY:  Family history of stroke (Mother)      no history of premature cardiovascular disease in first degree relatives    ROS: the patient denies fever, no chills, no HA, no seizures, no dizziness, no sore throat, no nasal congestion, no blurry vision, no CP, no palpitations, no SOB, no cough, abdominal feels distended, no diarrhea, no N/V, no dysuria, no leg pain, no claudication, no rash, no joint aches, no rectal pain or bleeding, no night sweats  All other systems reviewed and are negative    Vital Signs Last 24 Hrs  T(C): 36.6 (2021 07:55), Max: 38.1 (2021 15:25)  T(F): 97.9 (2021 07:55), Max: 100.6 (2021 15:25)  HR: 96 (2021 07:55) (96 - 119)  BP: 109/56 (2021 07:55) (104/50 - 142/79)  BP(mean): --  RR: 18 (2021 07:55) (18 - 18)  SpO2: 99% (2021 07:55) (97% - 99%)  Daily     Daily     PE:    Constitutional:  No acute distress  HEENT: NC/AT, EOMI, PERRLA, conjunctivae clear; ears and nose atraumatic; pharynx benign  Neck: supple; thyroid not palpable  Back: no tenderness  Respiratory: respiratory effort normal; clear to auscultation  Cardiovascular: S1S2 regular, no murmurs  Abdomen: soft, not tender, mild distended, positive BS; no liver or spleen organomegaly  Genitourinary: no suprapubic tenderness  Lymphatic: no LN palpable  Musculoskeletal: no muscle tenderness, no joint swelling or tenderness  Extremities: no pedal edema  Neurological/ Psychiatric: AxOx3, judgement and insight normal; moving all extremities  Skin: no rashes; no palpable lesions    Labs: all available labs reviewed                        9.0    10.63 )-----------( 220      ( 2021 06:51 )             28.1         129<L>  |  97  |  9   ----------------------------<  134<H>  3.6   |  25  |  0.47<L>    Ca    8.4<L>      2021 06:51  Mg     1.8         TPro  7.2  /  Alb  2.8<L>  /  TBili  0.3  /  DBili  x   /  AST  22  /  ALT  21  /  AlkPhos  109       LIVER FUNCTIONS - ( 2021 04:20 )  Alb: 2.8 g/dL / Pro: 7.2 gm/dL / ALK PHOS: 109 U/L / ALT: 21 U/L / AST: 22 U/L / GGT: x           Urinalysis Basic - ( 2021 05:40 )    Color: Yellow / Appearance: Clear / S.010 / pH: x  Gluc: x / Ketone: Negative  / Bili: Negative / Urobili: Negative mg/dL   Blood: x / Protein: Negative mg/dL / Nitrite: Negative   Leuk Esterase: Negative / RBC: x / WBC x   Sq Epi: x / Non Sq Epi: x / Bacteria: x    COVID ( @ 04:20)  [facility]      Radiology: all available radiological tests reviewed    < from: CT Abdomen and Pelvis No Cont (21 @ 19:10) >  Enlarged pancreatic head which likely corresponds with the previously seen pancreatic head mass on 2021;evaluation/comparison limited in the absence of intravenous contrast.  Large cystic lesion arising from the pancreatic body with interval placement of a cystogastrostomy tube, currently measuring 8.2 x 5.3 cm, previously 6.2 x8.0 cm on 2021.  Air in the cystic lesion compatible with the communication with the stomach, however underlying infection cannot be excluded.  New low-attenuation lesions in the liver, likely metastasis.  Extensive peritoneal carcinomatosis/omental implants, progressed since 2021.  < end of copied text >      Advanced directives addressed: full resuscitation

## 2021-06-15 ENCOUNTER — TRANSCRIPTION ENCOUNTER (OUTPATIENT)
Age: 66
End: 2021-06-15

## 2021-06-15 VITALS
TEMPERATURE: 98 F | SYSTOLIC BLOOD PRESSURE: 136 MMHG | OXYGEN SATURATION: 99 % | RESPIRATION RATE: 16 BRPM | DIASTOLIC BLOOD PRESSURE: 79 MMHG | HEART RATE: 92 BPM

## 2021-06-15 LAB
ADD ON TEST-SPECIMEN IN LAB: SIGNIFICANT CHANGE UP
CORTIS AM PEAK SERPL-MCNC: 13 UG/DL — SIGNIFICANT CHANGE UP (ref 6–18.4)
CREAT ?TM UR-MCNC: 10 MG/DL — SIGNIFICANT CHANGE UP
OSMOLALITY UR: 126 MOSM/KG — SIGNIFICANT CHANGE UP (ref 50–1200)
SODIUM UR-SCNC: 36 MMOL/L — SIGNIFICANT CHANGE UP
URATE UR-MCNC: 6.7 MG/DL — SIGNIFICANT CHANGE UP

## 2021-06-15 PROCEDURE — 99239 HOSP IP/OBS DSCHRG MGMT >30: CPT

## 2021-06-15 RX ORDER — LIPASE/PROTEASE/AMYLASE 16-48-48K
2 CAPSULE,DELAYED RELEASE (ENTERIC COATED) ORAL
Qty: 0 | Refills: 0 | DISCHARGE
Start: 2021-06-15

## 2021-06-15 RX ORDER — OLANZAPINE 15 MG/1
1 TABLET, FILM COATED ORAL
Qty: 0 | Refills: 0 | DISCHARGE
Start: 2021-06-15

## 2021-06-15 RX ORDER — FENTANYL CITRATE 50 UG/ML
1 INJECTION INTRAVENOUS
Qty: 0 | Refills: 0 | DISCHARGE
Start: 2021-06-15

## 2021-06-15 RX ADMIN — FENTANYL CITRATE 1 PATCH: 50 INJECTION INTRAVENOUS at 07:33

## 2021-06-15 RX ADMIN — Medication 1: at 12:02

## 2021-06-15 RX ADMIN — Medication 1000 MILLIGRAM(S): at 00:00

## 2021-06-15 RX ADMIN — SODIUM CHLORIDE 75 MILLILITER(S): 9 INJECTION INTRAMUSCULAR; INTRAVENOUS; SUBCUTANEOUS at 06:15

## 2021-06-15 RX ADMIN — PANTOPRAZOLE SODIUM 40 MILLIGRAM(S): 20 TABLET, DELAYED RELEASE ORAL at 10:21

## 2021-06-15 RX ADMIN — Medication 0.5 MILLIGRAM(S): at 00:11

## 2021-06-15 RX ADMIN — Medication 2 CAPSULE(S): at 12:01

## 2021-06-15 RX ADMIN — OLANZAPINE 2.5 MILLIGRAM(S): 15 TABLET, FILM COATED ORAL at 10:21

## 2021-06-15 RX ADMIN — PIPERACILLIN AND TAZOBACTAM 25 GRAM(S): 4; .5 INJECTION, POWDER, LYOPHILIZED, FOR SOLUTION INTRAVENOUS at 06:00

## 2021-06-15 RX ADMIN — Medication 2 CAPSULE(S): at 08:05

## 2021-06-15 RX ADMIN — HYDROMORPHONE HYDROCHLORIDE 8 MILLIGRAM(S): 2 INJECTION INTRAMUSCULAR; INTRAVENOUS; SUBCUTANEOUS at 08:03

## 2021-06-15 RX ADMIN — ENOXAPARIN SODIUM 40 MILLIGRAM(S): 100 INJECTION SUBCUTANEOUS at 10:21

## 2021-06-15 NOTE — DISCHARGE NOTE PROVIDER - HOSPITAL COURSE
CHIEF COMPLAINT:  Pt. 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 According to the son - who is patient's healthcare proxy, dad was doing well yesterday, but throughout the day he became more confused and forgetful - not making sense. Patient's care was discussed with outpatient doctor - who noted that if symptoms to continue to come in the hospital. Patient after the initial confusion, went to sleep, but woke up 3 am - with night sweats, fevers, hiccups and continued to be confused. Patient was brought in the hospital. In the hospital patient appears to be very comfortable but is sweating.   [facility] covering physician Dr. Hendrickson has been contacted.       HOSPITAL COURSE:   Problem/Plan - 1:  ·  Problem: Delirium. Acute Metabolic Encephalopathy   - this could be secondary to dehydration /  pain medications /  underlying malignancy /  infectious etiology /  electrolyte abnormalities/recent use of GEMCITABINE (first dose)   - IV hydration  6/14 - resolving, resume patient's fentanyl (reportedly takes 112-125mcg at home) - will resume fentanyl 100mcg for now and monitor   6/15 - patient's pain is  stable on 100mcg fentanyl; is at his baseline mental status - suspect 2/2 gemcitabine use and hyponatremia    Problem/Plan - 2:  ·  Problem: Fever, unspecified fever cause.  Plan: - malignancy vs Infectious process related in an immunocompromised host  - WBC is elevated   - lactate normal  - pan culture -  pending blood cultures results  - will hydrate patient -  as patient is profusely sweating   - will give initial dose of IV antibiotics as patient is immunocompromised and might not manifesting clear signs of infection  6/14 - discussed with Dr Yost, cont IV ABX  6/15 - Cxs negative, plan to dc home off abx, discussed with Dr Yost       Problem/Plan - 3:  ·  Problem: Malignant neoplasm of pancreas, unspecified location of malignancy.  Plan: - patient currently on palliative regiment of Gemcitabine  - as per patient's son -  he has a pancreatic stent  6/14 - currently being followed out by [facility] discussed with Dr Hendrickson     Problem/Plan - 4:  ·  Problem: Hyponatremia.  Plan: - dehydration /  hyponatremia  - monitoring Na  - IV hydration.  6/14 - check labs, Renal Cx      Problem/Plan - 5:  ·  Problem: DM (diabetes mellitus).  Plan: - hold home metformin  - ISS.     Problem/Plan - 6:  Problem: Hiccups. Plan: - continue with home regiment of baclofen  / lorazepam.    discussed with team at [facility] and Cxs   DNR DNI    OTHER DETAILS:   6/15 - slept well last night, ambulated; pain is under control     PHYSICAL EXAM:  Vital Signs Last 24 Hrs  T(C): 36.7 (15 Huber 2021 08:48), Max: 36.8 (14 Jun 2021 21:36)  T(F): 98 (15 Huber 2021 08:48), Max: 98.2 (14 Jun 2021 21:36)  HR: 92 (15 Huber 2021 08:48) (92 - 105)  BP: 136/79 (15 Huber 2021 08:48) (110/59 - 136/79)  RR: 16 (15 Huber 2021 08:48) (16 - 18)  SpO2: 99% (15 Huber 2021 08:48) (96% - 99%)  GENERAL: NAD, able to lie flat in bed  HEAD:  Atraumatic, Normocephalic  EYES: EOMI, PERRLA, normal sclera  ENT: Moist mucous membranes  NECK: Supple, No JVD, no nuchal rigidity  CHEST/LUNG: Clear to auscultation bilaterally; No rales, rhonchi, wheezing, or rubs. Unlabored respirations  HEART: Regular rate and rhythm; No murmurs, rubs, or gallops  ABDOMEN: Bowel sounds present; Soft, Nontender, Nondistended. No hepatomegaly  EXTREMITIES:  no pitting bilaterally  NERVOUS SYSTEM:  Alert & Oriented X3, speech clear. No focal motor or sensory deficits  MSK: FROM all 4 extremities, full and equal strength  SKIN: No rashes or lesions    LABS: All Labs Reviewed:                     9.0    10.63 )-----------( 220      ( 14 Jun 2021 06:51 )             28.1   131<L>  |  99  |  8   ----------------------------<  124<H>  3.7   |  24  |  0.44<L>  Ca    8.4<L>      15 Huber 2021 07:29    RADIOLOGY/EKG:  < from: CT Abdomen and Pelvis No Cont (06.13.21 @ 19:10) >  Enlarged pancreatic head which likely corresponds with the previously seen pancreatic head mass on 2/13/2021;evaluation/comparison limited in the absence of intravenous contrast.  Large cystic lesion arising from the pancreatic body with interval placement of a cystogastrostomy tube, currently measuring 8.2 x 5.3 cm, previously 6.2 x8.0 cm on 2/11/2021.    discussed with wife at bedside and son   dc home today  time spent on discharge - 45 mins

## 2021-06-15 NOTE — DISCHARGE NOTE NURSING/CASE MANAGEMENT/SOCIAL WORK - PATIENT PORTAL LINK FT
You can access the FollowMyHealth Patient Portal offered by [facility] by registering at the following website: [email or web address]. By joining [facility]’s FollowMyHealth portal, you will also be able to view your health information using other applications (apps) compatible with our system.

## 2021-06-15 NOTE — DISCHARGE NOTE PROVIDER - PROVIDER TOKENS
FREE:[LAST:[Follow-up with your Oncologist],PHONE:[(   )    -],FAX:[(   )    -],FOLLOWUP:[1 week]],PROVIDER:[TOKEN:[[id number]],FOLLOWUP:[Routine]]

## 2021-06-15 NOTE — PROGRESS NOTE ADULT - SUBJECTIVE AND OBJECTIVE BOX
Date of service: 06-15-21 @ 08:11    Lying in bed in NAD  Weak looking  Dose not seem in pain    ROS: no fever or chills; poorly verbal    MEDICATIONS  (STANDING):  acetaminophen   Tablet .. 1000 milliGRAM(s) Oral every 6 hours  dextrose 40% Gel 15 Gram(s) Oral once  dextrose 5%. 1000 milliLiter(s) (50 mL/Hr) IV Continuous <Continuous>  dextrose 5%. 1000 milliLiter(s) (100 mL/Hr) IV Continuous <Continuous>  dextrose 50% Injectable 25 Gram(s) IV Push once  dextrose 50% Injectable 12.5 Gram(s) IV Push once  dextrose 50% Injectable 25 Gram(s) IV Push once  enoxaparin Injectable 40 milliGRAM(s) SubCutaneous every 12 hours  fentaNYL   Patch 100 MICROgram(s)/Hr. 1 Patch Transdermal every 48 hours  glucagon  Injectable 1 milliGRAM(s) IntraMuscular once  insulin lispro (ADMELOG) corrective regimen sliding scale   SubCutaneous three times a day before meals  LORazepam     Tablet 0.5 milliGRAM(s) Oral three times a day  OLANZapine 2.5 milliGRAM(s) Oral daily  pancrelipase  (CREON 12,000 Lipase Units) 2 Capsule(s) Oral three times a day with meals  pantoprazole    Tablet 40 milliGRAM(s) Oral daily  piperacillin/tazobactam IVPB.. 3.375 Gram(s) IV Intermittent every 8 hours  polyethylene glycol 3350 17 Gram(s) Oral every 12 hours  sodium chloride 0.9%. 1000 milliLiter(s) (75 mL/Hr) IV Continuous <Continuous>    Vital Signs Last 24 Hrs  T(C): 36.8 (2021 21:36), Max: 36.8 (2021 21:36)  T(F): 98.2 (2021 21:36), Max: 98.2 (2021 21:36)  HR: 92 (2021 21:36) (92 - 105)  BP: 110/59 (2021 21:36) (110/59 - 123/67)  BP(mean): --  RR: 18 (2021 21:36) (18 - 18)  SpO2: 96% (2021 21:36) (96% - 98%)     Physical exam:    Constitutional:  No acute distress  HEENT: NC/AT, EOMI, PERRLA, conjunctivae clear  Neck: supple; thyroid not palpable  Back: no tenderness  Respiratory: respiratory effort normal; clear to auscultation  Cardiovascular: S1S2 regular, no murmurs  Abdomen: soft, not tender, mild distended, positive BS  Genitourinary: no suprapubic tenderness  Lymphatic: no LN palpable  Musculoskeletal: no muscle tenderness, no joint swelling or tenderness  Extremities: no pedal edema  Neurological/ Psychiatric: AxOx3, moving all extremities  Skin: no rashes; no palpable lesions    Labs: all available labs reviewed                        9.0    10.63 )-----------( 220      ( 2021 06:51 )             28.1     06-14    129<L>  |  97  |  9   ----------------------------<  134<H>  3.6   |  25  |  0.47<L>    Ca    8.4<L>      2021 06:51  Mg     1.8     -    TPro  7.2  /  Alb  2.8<L>  /  TBili  0.3  /  DBili  x   /  AST  22  /  ALT  21  /  AlkPhos  109  06-13     LIVER FUNCTIONS - ( 2021 04:20 )  Alb: 2.8 g/dL / Pro: 7.2 gm/dL / ALK PHOS: 109 U/L / ALT: 21 U/L / AST: 22 U/L / GGT: x           Urinalysis Basic - ( 2021 05:40 )    Color: Yellow / Appearance: Clear / S.010 / pH: x  Gluc: x / Ketone: Negative  / Bili: Negative / Urobili: Negative mg/dL   Blood: x / Protein: Negative mg/dL / Nitrite: Negative   Leuk Esterase: Negative / RBC: x / WBC x   Sq Epi: x / Non Sq Epi: x / Bacteria: x    [facility] ( @ 04:20)  [facility]      Culture - Blood (collected 2021 04:57)  Source: .Blood None  Preliminary Report (2021 11:00):    No growth to date.    Culture - Blood (collected 2021 04:57)  Source: .Blood None  Preliminary Report (2021 11:00):    No growth to date.    Radiology: all available radiological tests reviewed    < from: CT Abdomen and Pelvis No Cont (21 @ 19:10) >  Enlarged pancreatic head which likely corresponds with the previously seen pancreatic head mass on 2021;evaluation/comparison limited in the absence of intravenous contrast.  Large cystic lesion arising from the pancreatic body with interval placement of a cystogastrostomy tube, currently measuring 8.2 x 5.3 cm, previously 6.2 x8.0 cm on 2021.  Air in the cystic lesion compatible with the communication with the stomach, however underlying infection cannot be excluded.  New low-attenuation lesions in the liver, likely metastasis.  Extensive peritoneal carcinomatosis/omental implants, progressed since 2021.  < end of copied text >      Advanced directives addressed: full resuscitation
remains afebrile    acetaminophen   Tablet .. 1000 milliGRAM(s) Oral every 6 hours  baclofen 5 milliGRAM(s) Oral every 6 hours PRN  enoxaparin Injectable 40 milliGRAM(s) SubCutaneous every 12 hours  fentaNYL   Patch 100 MICROgram(s)/Hr. 1 Patch Transdermal every 48 hours  glucagon  Injectable 1 milliGRAM(s) IntraMuscular once  HYDROmorphone   Tablet 8 milliGRAM(s) Oral three times a day PRN  insulin lispro (ADMELOG) corrective regimen sliding scale   SubCutaneous three times a day before meals  LORazepam     Tablet 0.5 milliGRAM(s) Oral three times a day  OLANZapine 2.5 milliGRAM(s) Oral daily  pancrelipase  (CREON 12,000 Lipase Units) 2 Capsule(s) Oral three times a day with meals  pantoprazole    Tablet 40 milliGRAM(s) Oral daily  piperacillin/tazobactam IVPB.. 3.375 Gram(s) IV Intermittent every 8 hours  polyethylene glycol 3350 17 Gram(s) Oral every 12 hours  simethicone 80 milliGRAM(s) Chew every 6 hours PRN  sodium chloride 0.9%. 1000 milliLiter(s) IV Continuous <Continuous>      No Known Allergies      ROS otherwise negative     T(C): 36.7 (06-15-21 @ 08:48), Max: 36.8 (06-14-21 @ 21:36)  HR: 92 (06-15-21 @ 08:48) (92 - 105)  BP: 136/79 (06-15-21 @ 08:48) (110/59 - 136/79)  RR: 16 (06-15-21 @ 08:48) (16 - 18)  SpO2: 99% (06-15-21 @ 08:48) (96% - 99%)  PHYSICAL EXAM  Gen:  laying in bed, nad  H:  anicteric, eomi  Ext:  no edema                          9.0    10.63 )-----------( 220      ( 14 Jun 2021 06:51 )             28.1                         9.2    13.74 )-----------( 258      ( 13 Jun 2021 04:20 )             29.0   06-14    129<L>  |  97  |  9   ----------------------------<  134<H>  3.6   |  25  |  0.47<L>    Ca    8.4<L>      14 Jun 2021 06:51    Culture - Blood (06.13.21 @ 04:57)    Specimen Source: .Blood None    Culture Results:   No growth to date.    Specimen Source: .Blood None (06.13.21 @ 04:57)    
Patient is a 65y Male who feels mu;ch better he reports, walkning w wife in hallwway.     MEDICATIONS  (STANDING):  acetaminophen   Tablet .. 1000 milliGRAM(s) Oral every 6 hours  dextrose 40% Gel 15 Gram(s) Oral once  dextrose 5%. 1000 milliLiter(s) (50 mL/Hr) IV Continuous <Continuous>  dextrose 5%. 1000 milliLiter(s) (100 mL/Hr) IV Continuous <Continuous>  dextrose 50% Injectable 25 Gram(s) IV Push once  dextrose 50% Injectable 12.5 Gram(s) IV Push once  dextrose 50% Injectable 25 Gram(s) IV Push once  enoxaparin Injectable 40 milliGRAM(s) SubCutaneous every 12 hours  fentaNYL   Patch 100 MICROgram(s)/Hr. 1 Patch Transdermal every 48 hours  glucagon  Injectable 1 milliGRAM(s) IntraMuscular once  insulin lispro (ADMELOG) corrective regimen sliding scale   SubCutaneous three times a day before meals  LORazepam     Tablet 0.5 milliGRAM(s) Oral three times a day  OLANZapine 2.5 milliGRAM(s) Oral daily  pancrelipase  (CREON 12,000 Lipase Units) 2 Capsule(s) Oral three times a day with meals  pantoprazole    Tablet 40 milliGRAM(s) Oral daily  piperacillin/tazobactam IVPB.. 3.375 Gram(s) IV Intermittent every 8 hours  polyethylene glycol 3350 17 Gram(s) Oral every 12 hours  sodium chloride 0.9%. 1000 milliLiter(s) (75 mL/Hr) IV Continuous <Continuous>    MEDICATIONS  (PRN):  baclofen 5 milliGRAM(s) Oral every 6 hours PRN Hiccups  HYDROmorphone   Tablet 8 milliGRAM(s) Oral three times a day PRN Severe Pain (7 - 10)  simethicone 80 milliGRAM(s) Chew every 6 hours PRN Gas        T(C): , Max: 36.8 (06-14-21 @ 21:36)  T(F): , Max: 98.2 (06-14-21 @ 21:36)  HR: 92 (06-15-21 @ 08:48)  BP: 136/79 (06-15-21 @ 08:48)  BP(mean): --  RR: 16 (06-15-21 @ 08:48)  SpO2: 99% (06-15-21 @ 08:48)  Wt(kg): --    06-14 @ 07:01  -  06-15 @ 07:00  --------------------------------------------------------  IN: 2120 mL / OUT: 1225 mL / NET: 895 mL          PHYSICAL EXAM:    Constitutional: NAD  HEENT: PERRLA, EOMI,  MMM  Neck: No LAD, No JVD  Respiratory: CTAB  Cardiovascular: S1 and S2, RRR  Extremities: No peripheral edema  Neurological: A/O x 3, no focal deficits  Psychiatric: Normal mood, normal affect  : No [place]  Skin: No rashes  Access: Not applicable        LABS:                        9.0    10.63 )-----------( 220      ( 14 Jun 2021 06:51 )             28.1     14 Jun 2021 06:51    129    |  97     |  9      ----------------------------<  134    3.6     |  25     |  0.47   13 Jun 2021 04:20    125    |  93     |  12     ----------------------------<  94     4.0     |  26     |  0.55     Ca    8.4        14 Jun 2021 06:51  Ca    8.3        13 Jun 2021 04:20  Mg     1.8       13 Jun 2021 04:20    TPro  7.2    /  Alb  2.8    /  TBili  0.3    /  DBili  x      /  AST  22     /  ALT  21     /  AlkPhos  109    13 Jun 2021 04:20      Osmolality, Serum: 274 mosmol/kg *L* [280 - 301] (06-14 @ 16:15)      Urine Studies:    Osmolality, Random Urine: 126 mosm/Kg (06-14 @ 13:20)  Sodium, Random Urine: 36 mmol/L (06-14 @ 13:20)  Creatinine, Random Urine: 10 mg/dL (06-14 @ 13:20)        RADIOLOGY & ADDITIONAL STUDIES:              
INTERVAL HPI/OVERNIGHT EVENTS:  Patient S&E at bedside. No o/n events, patient resting comfortably. No complaints at this time. Patient denies fever, chills, dizziness, weakness, CP, palpitations, SOB, cough, N/V/D/C, dysuria, changes in bowel movements, LE edema.    ICU Vital Signs Last 24 Hrs  T(C): 36.6 (2021 07:55), Max: 38.1 (2021 15:25)  T(F): 97.9 (2021 07:55), Max: 100.6 (2021 15:25)  HR: 96 (2021 07:55) (96 - 119)  BP: 109/56 (2021 07:55) (104/50 - 142/79)  BP(mean): --  ABP: --  ABP(mean): --  RR: 18 (2021 07:55) (18 - 18)  SpO2: 99% (2021 07:55) (97% - 99%)      MEDICATIONS  (STANDING):  acetaminophen   Tablet .. 1000 milliGRAM(s) Oral every 6 hours  dextrose 40% Gel 15 Gram(s) Oral once  dextrose 5%. 1000 milliLiter(s) (50 mL/Hr) IV Continuous <Continuous>  dextrose 5%. 1000 milliLiter(s) (100 mL/Hr) IV Continuous <Continuous>  dextrose 50% Injectable 25 Gram(s) IV Push once  dextrose 50% Injectable 12.5 Gram(s) IV Push once  dextrose 50% Injectable 25 Gram(s) IV Push once  enoxaparin Injectable 40 milliGRAM(s) SubCutaneous every 12 hours  glucagon  Injectable 1 milliGRAM(s) IntraMuscular once  insulin lispro (ADMELOG) corrective regimen sliding scale   SubCutaneous three times a day before meals  LORazepam     Tablet 0.5 milliGRAM(s) Oral three times a day  OLANZapine 2.5 milliGRAM(s) Oral daily  pancrelipase  (CREON 12,000 Lipase Units) 2 Capsule(s) Oral three times a day with meals  pantoprazole    Tablet 40 milliGRAM(s) Oral daily  piperacillin/tazobactam IVPB.. 3.375 Gram(s) IV Intermittent every 8 hours  polyethylene glycol 3350 17 Gram(s) Oral every 12 hours  sodium chloride 0.9%. 1000 milliLiter(s) (75 mL/Hr) IV Continuous <Continuous>    MEDICATIONS  (PRN):  baclofen 5 milliGRAM(s) Oral every 6 hours PRN Hiccups  HYDROmorphone   Tablet 8 milliGRAM(s) Oral three times a day PRN Severe Pain (7 - 10)  simethicone 80 milliGRAM(s) Chew every 6 hours PRN Gas      Allergies    No Known Allergies    Intolerances        LABS:                        9.0    10.63 )-----------( 220      ( 2021 06:51 )             28.1     06-    129<L>  |  97  |  9   ----------------------------<  134<H>  3.6   |  25  |  0.47<L>    Ca    8.4<L>      2021 06:51  Mg     1.8     -    TPro  7.2  /  Alb  2.8<L>  /  TBili  0.3  /  DBili  x   /  AST  22  /  ALT  21  /  AlkPhos  109  06-13    PT/INR - ( 2021 04:20 )   PT: 13.6 sec;   INR: 1.18 ratio         PTT - ( 2021 04:20 )  PTT:26.2 sec  Urinalysis Basic - ( 2021 05:40 )    Color: Yellow / Appearance: Clear / S.010 / pH: x  Gluc: x / Ketone: Negative  / Bili: Negative / Urobili: Negative mg/dL   Blood: x / Protein: Negative mg/dL / Nitrite: Negative   Leuk Esterase: Negative / RBC: x / WBC x   Sq Epi: x / Non Sq Epi: x / Bacteria: x        RADIOLOGY & ADDITIONAL TESTS:  Studies reviewed.    ASSESSMENT & PLAN: 
CHIEF COMPLAINT:Pt. 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 According to the son - who is patient's healthcare proxy, dad was doing well yesterday, but throughout the day he became more confused and forgetful - not making sense. Patient's care was discussed with outpatient doctor - who noted that if symptoms to continue to come in the hospital. Patient after the initial confusion, went to sleep, but woke up 3 am - with night sweats, fevers, hiccups and continued to be confused. Patient was brought in the hospital. In the hospital patient appears to be very comfortable but is sweating. When woken up -  patient almost immediately started hiccups. Patient appears to be comfortable. Denies any HA, CP, SOB. Some abdominal discomfort. Care discussed with patient's son. We went over all the lab work.   [place] covering physician Dr. Hendrickson has been contacted.     Care discussed with patient's son -  patient's family at this point going to continue with Gemcitabine. We discussed hospice -  patient's family wants to continue with Gemcitabine at this time. Patient is DNR, but not DNI as per son as his dad's request.      6/14 - no cp palps sob; more awake, ambulated later in the day     PHYSICAL EXAM:  Vital Signs Last 24 Hrs  T(C): 36.4 (14 Jun 2021 15:59), Max: 37.2 (14 Jun 2021 05:09)  T(F): 97.5 (14 Jun 2021 15:59), Max: 98.9 (14 Jun 2021 05:09)  HR: 105 (14 Jun 2021 15:59) (96 - 113)  BP: 123/67 (14 Jun 2021 15:59) (104/50 - 123/67)  RR: 18 (14 Jun 2021 15:59) (18 - 18)  SpO2: 98% (14 Jun 2021 15:59) (98% - 99%)  Constitutional: NAD, awake and alert, well-developed  HEENT: PERR, EOMI  Neck: Soft and supple,  No JVD  Respiratory: Breath sounds are clear bilaterally, No wheezing, rales or rhonchi  Cardiovascular: S1 and S2, regular rate and rhythm, no Murmurs  Gastrointestinal: Bowel Sounds present, soft, nontender, nondistended  Extremities: No peripheral edema  Vascular: 2+ peripheral pulses  Neurological: A/O x 3, no focal deficits  Musculoskeletal: 5/5 strength b/l upper and lower extremities  Skin: No rashes    MEDICATIONS:  MEDICATIONS  (STANDING):  acetaminophen   Tablet .. 1000 milliGRAM(s) Oral every 6 hours  dextrose 40% Gel 15 Gram(s) Oral once  dextrose 5%. 1000 milliLiter(s) (50 mL/Hr) IV Continuous <Continuous>  dextrose 5%. 1000 milliLiter(s) (100 mL/Hr) IV Continuous <Continuous>  dextrose 50% Injectable 25 Gram(s) IV Push once  dextrose 50% Injectable 12.5 Gram(s) IV Push once  dextrose 50% Injectable 25 Gram(s) IV Push once  enoxaparin Injectable 40 milliGRAM(s) SubCutaneous every 12 hours  fentaNYL   Patch 100 MICROgram(s)/Hr. 1 Patch Transdermal every 48 hours  glucagon  Injectable 1 milliGRAM(s) IntraMuscular once  insulin lispro (ADMELOG) corrective regimen sliding scale   SubCutaneous three times a day before meals  LORazepam     Tablet 0.5 milliGRAM(s) Oral three times a day  OLANZapine 2.5 milliGRAM(s) Oral daily  pancrelipase  (CREON 12,000 Lipase Units) 2 Capsule(s) Oral three times a day with meals  pantoprazole    Tablet 40 milliGRAM(s) Oral daily  piperacillin/tazobactam IVPB.. 3.375 Gram(s) IV Intermittent every 8 hours  polyethylene glycol 3350 17 Gram(s) Oral every 12 hours  sodium chloride 0.9%. 1000 milliLiter(s) (75 mL/Hr) IV Continuous <Continuous>      LABS: All Labs Reviewed:                        9.0    10.63 )-----------( 220      ( 14 Jun 2021 06:51 )             28.1     129<L>  |  97  |  9   ----------------------------<  134<H>  3.6   |  25  |  0.47<L>  Ca    8.4<L>      14 Jun 2021 06:51  Mg     1.8     06-13  TPro  7.2  /  Alb  2.8<L>  /  TBili  0.3  /  DBili  x   /  AST  22  /  ALT  21  /  AlkPhos  109  06-13  PT/INR - ( 13 Jun 2021 04:20 )   PT: 13.6 sec;   INR: 1.18 ratio     PTT - ( 13 Jun 2021 04:20 )  PTT:26.2 sec  CARDIAC MARKERS ( 13 Jun 2021 04:20 )  <0.015 ng/mL / x     / 77 U/L / x     / x        RADIOLOGY/EKG:  < from: CT Abdomen and Pelvis No Cont (06.13.21 @ 19:10) >  IMPRESSION:  Enlarged pancreatic head which likely corresponds with the previously seen pancreatic head mass on 2/13/2021;evaluation/comparison limited in the absence of intravenous contrast.  Large cystic lesion arising from the pancreatic body with interval placement of a cystogastrostomy tube, currently measuring 8.2 x 5.3 cm, previously 6.2 x8.0 cm on 2/11/2021.  Air in the cystic lesion compatible with the communication with the stomach, however underlying infection cannot be excluded.  New low-attenuation lesions in the liver, likely metastasis.  Extensive peritoneal carcinomatosis/omental implants, progressed since 2/13/2021.  A preliminary report was provided by [facility].

## 2021-06-15 NOTE — PROGRESS NOTE ADULT - REASON FOR ADMISSION
AMS /  Fevers /  Hiccups

## 2021-06-15 NOTE — PHYSICAL THERAPY INITIAL EVALUATION ADULT - PERTINENT HX OF CURRENT PROBLEM, REHAB EVAL
Pt was admitted on 6/13 for increased confusion x one day. According to the son - who is patient's healthcare proxy, dad was doing well the day PTA, but throughout the day he became more confused and forgetful - not making sense. Patient after the initial confusion, went to sleep, but woke up 3 am - with night sweats, fevers, hiccups and continued to be confused. In ER he was noted with hiccups and low grade fever.

## 2021-06-15 NOTE — DISCHARGE NOTE PROVIDER - NSDCCPCAREPLAN_GEN_ALL_CORE_FT
PRINCIPAL DISCHARGE DIAGNOSIS  Diagnosis: Delirium  Assessment and Plan of Treatment: likely from gemcitabine use      SECONDARY DISCHARGE DIAGNOSES  Diagnosis: Fever, unspecified fever cause  Assessment and Plan of Treatment: may be tumor fever or due to chemo, cultures here are  negative    Diagnosis: Malignant neoplasm of pancreas, unspecified location of malignancy  Assessment and Plan of Treatment: follow-up with Oncology    Diagnosis: Hyponatremia  Assessment and Plan of Treatment: Improved after fluids, if needed can follow up with Nephrology

## 2021-06-15 NOTE — DISCHARGE NOTE PROVIDER - CARE PROVIDER_API CALL
Follow-up with your Oncologist,   Phone: (   )    -  Fax: (   )    -  Follow Up Time: 1 week    Duy Guzmán)  Internal Medicine; Nephrology  [address] [place] [address]  Phone: [phone]  Fax: [phone]  Follow Up Time: Routine

## 2021-06-15 NOTE — PHYSICAL THERAPY INITIAL EVALUATION ADULT - DISCHARGE DISPOSITION, PT EVAL
Pt amb about 150' with supervision and hand held assist by spouse, pt is slow and steady on feet, no c/o during ambulation, pt and family refusing any anticipated DC PT needs/home/home w/ assist

## 2021-06-15 NOTE — PHYSICAL THERAPY INITIAL EVALUATION ADULT - DIAGNOSIS, PT EVAL
Low grade fever. Encephalopathy. Unresectable pancreatic Ca on gemcitabine. Peritoneal carcinomatosis.

## 2021-06-15 NOTE — DISCHARGE NOTE PROVIDER - NSDCMRMEDTOKEN_GEN_ALL_CORE_FT
acetaminophen 160 mg/5 mL oral suspension: 31.25 milliliter(s) orally every 6 hours, As needed, Mild Pain (1 - 3), Moderate Pain (4 - 6)  baclofen 5 mg oral tablet: 1 tab(s) orally 3 times a day  famotidine 20 mg oral tablet: 1 tab(s) orally 2 times a day  fentaNYL 100 mcg/hr transdermal film, extended release: 1 patch transdermal every 48 hours  LORazepam 0.5 mg oral tablet: 1 tab(s) orally 3 times a day  metFORMIN 500 mg oral tablet: 1 tab(s) orally 2 times a day  OLANZapine 2.5 mg oral tablet: 1 tab(s) orally once a day  oxyCODONE 5 mg oral tablet: 1 tab(s) orally every 6 hours MDD:4 tabs  pancrelipase 12,000 units-38,000 units-60,000 units oral delayed release capsule: 2 cap(s) orally 3 times a day (with meals)

## 2021-06-15 NOTE — PROGRESS NOTE ADULT - ASSESSMENT
66 yo male with history o f metastatic pancreatic CA on GEM ABRAXANE admitted for FEVER, sepsis     FEVER- Severe sepsis likely secondary to GI source   - continue with ZOSYN  Day #2   - possible Drug induced fever yet will need to await cultures      -- CULTURES thus far has been negative   - follow up Blood cultures     Pancreatic CA   - s/p GEM ABRAXANE on 6/11   - most recent CT from 6/9 suggest POD with Hepatic mets hence rationale for transitioning from mFOLFIRINOX to GEM ABRAXANE    Hyponatremia   - check serum and urine osm   - possible secondary to hypovolemia   - continue with IVF   - f/u Na today      DVTppx: continue with Lovenox     We will be happy to answer any onc questions on behalf of [facility] and will be in touch with them.     Freedom Markham MD  Hematology/Oncology  Cell:  [phone]  Office Phone: [phone]  Office Fax:  [phone] [address] [place] [address] 
66 yo male with hx of recent dx pancreatic ca in Feb 2021 and treated with gemcitabine and recent AMS   Hx of Hyponatremia in May and now returns with c/p AMS and poor po intake with hiccups and nausea    Hyponatremia  improving after IVF Saline - hypovolemia/tea and toast diet likely based on response and urine studies   -AM labs pending, suspect Na will be improved   -Will stop saline for now   -Suspect will need IVF post chemo, or Salt T's during times post chemo when po not at goal     d/w family at bedside at length and staff  
66 yo male with history o f metastatic pancreatic CA on GEM ABRAXANE admitted for FEVER, sepsis     FEVER- Severe sepsis likely secondary to GI source   - continue with ZOSYN   - possible Drug induced fever yet will need to await cultures      -- CULTURES thus far has been negative   - follow up Blood cultures     Pancreatic CA   - s/p GEM ABRAXANE on 6/11   - most recent CT from 6/9 suggest POD with Hepatic mets hence rationale for transitioning from mFOLFIRINOX to GEM ABRAXANE    Hyponatremia   - check serum and urine osm   - possible secondary to hypovolemia   - continue with IVF   - Improving now 129        Discussed with son and patient  DVTppx: continue with Lovenox   
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 According to the son - who is patient's healthcare proxy, dad was doing well the day PTA, but throughout the day he became more confused and forgetful - not making sense. Patient after the initial confusion, went to sleep, but woke up 3 am - with night sweats, fevers, hiccups and continued to be confused. In ER he was noted with hiccups and low grade fever. Concern for an infectious process was raised. In ER he received zosyn.     1. Low grade fever. Encephalopathy. Unresectable pancreatic Ca on gemcitabine. Peritoneal carcinomatosis.   -possible sepsis vs tumor fever  -fever is down  -BC x 2 noted  -on zosyn 3.375 gm IV q8h # 2  -tolerating abx well so far; no side effects noted  -continue abx coverage for now  -f/u cultures  -monitor temps  -f/u CBC  -supportive care  2. Other issues:   -care per medicine    
Problem/Plan - 1:  ·  Problem: Delirium.  Plan: - slowly resolving  - this could be secondary to dehydration /  pain medications /  underlying malignancy /  infectious etiology /  electrolyte abnormalities/recent use of GEMCITABINE (first dose)   - IV hydration  6/14 - resolving, resume patient's fentanyl (reportedly takes 112-125mcg at home) - will resume fentanyl 100mcg for now and monitor     Problem/Plan - 2:  ·  Problem: Fever, unspecified fever cause.  Plan: - malignancy vs Infectious process related in an immunocompromised host  - WBC is elevated   - lactate normal  - pan culture -  pending blood cultures results  - will hydrate patient -  as patient is profusely sweating   - will give initial dose of IV antibiotics as patient is immunocompromised and might not manifesting clear signs of infection  6/14 - discussed with Dr Yost, cont IV ABX      Problem/Plan - 3:  ·  Problem: Malignant neoplasm of pancreas, unspecified location of malignancy.  Plan: - patient currently on palliative regiment of Gemcitabine  - as per patient's son -  he has a pancreatic stent  6/14 - currently being followed out by [facility] discussed with Dr Hendrickson     Problem/Plan - 4:  ·  Problem: Hyponatremia.  Plan: - dehydration /  hyponatremia  - monitoring Na  - IV hydration.  6/14 - check labs, Renal Cx      Problem/Plan - 5:  ·  Problem: DM (diabetes mellitus).  Plan: - hold home metformin  - ISS.     Problem/Plan - 6:  Problem: Hiccups. Plan: - continue with home regiment of baclofen  / lorazepam.    discussed with team at [facility] and [facility]   DNR DNI as above

## 2021-06-25 DIAGNOSIS — C25.0 MALIGNANT NEOPLASM OF HEAD OF PANCREAS: ICD-10-CM

## 2021-06-25 DIAGNOSIS — D84.9 IMMUNODEFICIENCY, UNSPECIFIED: ICD-10-CM

## 2021-06-25 DIAGNOSIS — T45.1X5A ADVERSE EFFECT OF ANTINEOPLASTIC AND IMMUNOSUPPRESSIVE DRUGS, INITIAL ENCOUNTER: ICD-10-CM

## 2021-06-25 DIAGNOSIS — R50.9 FEVER, UNSPECIFIED: ICD-10-CM

## 2021-06-25 DIAGNOSIS — K21.9 GASTRO-ESOPHAGEAL REFLUX DISEASE WITHOUT ESOPHAGITIS: ICD-10-CM

## 2021-06-25 DIAGNOSIS — Z20.822 CONTACT WITH AND (SUSPECTED) EXPOSURE TO COVID-19: ICD-10-CM

## 2021-06-25 DIAGNOSIS — E87.1 HYPO-OSMOLALITY AND HYPONATREMIA: ICD-10-CM

## 2021-06-25 DIAGNOSIS — R06.6 HICCOUGH: ICD-10-CM

## 2021-06-25 DIAGNOSIS — E11.9 TYPE 2 DIABETES MELLITUS WITHOUT COMPLICATIONS: ICD-10-CM

## 2021-06-25 DIAGNOSIS — R50.2 DRUG INDUCED FEVER: ICD-10-CM

## 2021-06-25 DIAGNOSIS — Z66 DO NOT RESUSCITATE: ICD-10-CM

## 2021-06-25 DIAGNOSIS — C78.7 SECONDARY MALIGNANT NEOPLASM OF LIVER AND INTRAHEPATIC BILE DUCT: ICD-10-CM

## 2021-06-25 DIAGNOSIS — Z79.84 LONG TERM (CURRENT) USE OF ORAL HYPOGLYCEMIC DRUGS: ICD-10-CM

## 2021-06-25 DIAGNOSIS — E86.1 HYPOVOLEMIA: ICD-10-CM

## 2021-06-25 DIAGNOSIS — G92 TOXIC ENCEPHALOPATHY: ICD-10-CM

## 2021-06-25 DIAGNOSIS — C78.6 SECONDARY MALIGNANT NEOPLASM OF RETROPERITONEUM AND PERITONEUM: ICD-10-CM
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
## 2022-03-01 NOTE — ED PROVIDER NOTE - NSCAREINITIATED _GEN_ER
Ruth Flores(Attending) Dutasteride Pregnancy And Lactation Text: This medication is absolutely contraindicated in women, especially during pregnancy and breast feeding. Feminization of male fetuses is possible if taking while pregnant.

## 2022-05-26 NOTE — ED ADULT TRIAGE NOTE - WEIGHT IN KG
59.9 Erivedge Counseling- I discussed with the patient the risks of Erivedge including but not limited to nausea, vomiting, diarrhea, constipation, weight loss, changes in the sense of taste, decreased appetite, muscle spasms, and hair loss.  The patient verbalized understanding of the proper use and possible adverse effects of Erivedge.  All of the patient's questions and concerns were addressed.

## 2022-08-29 NOTE — ED PROVIDER NOTE - CARDIAC PEDAL EDEMA
How Severe Is Your Skin Lesion?: mild
Have Your Skin Lesions Been Treated?: not been treated
Is This A New Presentation, Or A Follow-Up?: Skin Lesions
absent

## 2023-03-01 NOTE — ASU PREOP CHECKLIST - AS TEMP SITE
forehead

## 2023-03-30 NOTE — ED PROVIDER NOTE - CONSTITUTIONAL, MLM
normal... Well appearing, awake, alert, oriented to person, place, time/situation and in no apparent distress. Frail Posterior Auricular Interpolation Flap Text: A decision was made to reconstruct the defect utilizing an interpolation axial flap and a staged reconstruction.  A telfa template was made of the defect.  This telfa template was then used to outline the posterior auricular interpolation flap.  The donor area for the pedicle flap was then injected with anesthesia.  The flap was excised through the skin and subcutaneous tissue down to the layer of the underlying musculature.  The pedicle flap was carefully excised within this deep plane to maintain its blood supply.  The edges of the donor site were undermined.   The donor site was closed in a primary fashion.  The pedicle was then rotated into position and sutured.  Once the tube was sutured into place, adequate blood supply was confirmed with blanching and refill.  The pedicle was then wrapped with xeroform gauze and dressed appropriately with a telfa and gauze bandage to ensure continued blood supply and protect the attached pedicle.

## 2023-05-02 NOTE — PATIENT PROFILE ADULT - DO YOU LACK THE NECESSARY SUPPORT TO HELP YOU COPE WITH LIFE CHALLENGES?
Patient's pre-cert obtained by Armando Oden. Pre-cert good through Sunday 5/7/23. Will likely need updated therapy notes.     Clara Box, RN, BSN,    Ortho/Neuro   [phone] yes

## 2023-07-08 NOTE — DISCHARGE NOTE PROVIDER - EXTENDED VTE YES NO FOR MLM ENOXAPARIN
Patient seen today for follow-up visit     Michael Cisneros was present with me    Continues to remain extremely anxious    Continues to show extremely depressed affect    Continues to say that \"I do not feel like myself\"    Continues to state that she feels very weak and feels very overwhelmed     is in [facility] and not doing well with his medical and psychiatric issues    Patient has not been able to tolerate Abilify in the higher doses and in the low doses to psychosis was not controlled    Patient continues to talk about having these somatic delusions where she feels something sharp is breaking inside of her!    Reality orientation attempted with minimal relief    Brought up the issue about possibility of ECTs but patient again declined    Continue with supportive therapy    Continue with cognitive  And behavioral therapy      Continue with medication evaluation and stabilization   ,

## 2024-01-10 NOTE — ED ADULT NURSE NOTE - NSFALLRSKPASTHIST_ED_ALL_ED
INITIAL VISIT NOTE           HISTORY OF PRESENT ILLNESS       Magdaleno Castaneda is a 60 year old male presenting for a chief complaint of right shoulder pain.  He has chronic pain in the shoulder.  He had previous rotator cuff surgery on both shoulders 20 years or so ago.  The right shoulder was actually in 1999. He was seen in 2014 by Dr. Larson and at that time an MRI was done which documented chronic tearing of the rotator cuff and narrowing of the acromial humeral interval.  He did a course of therapy at that time which seemed to have aggravated his symptoms.  Of late his shoulder has worsened.  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  Nursing notes reviewed and accepted.    PAST MEDICAL, FAMILY AND SOCIAL HISTORY      Past Medical History:   Diagnosis Date    Arthritis     Diverticulosis of colon (without mention of hemorrhage) 7/9/15    mild sigmoid colon    Essential (primary) hypertension     Essential hypertension 8/22/2016    Gastroesophageal reflux disease     Gastroesophageal reflux disease without esophagitis 9/3/2019    Osteoarthritis of hip 12/12/2011    Osteoarthrosis, unspecified whether generalized or localized, pelvic region and thigh 12/13/2011    Other hyperlipidemia 8/18/2022    Staphylococcus aureus infection     Unspecified hemorrhoids without mention of complication 7/9/15    tiny internal      Past Surgical History:   Procedure Laterality Date    Colonoscopy diagnostic  7/9/15 recall due 7/2025    Dr Zavaleta    Joint replacement  2013    left hip resurfacing    Past surgical history  1977    Slip Hip Epiphysis    Past surgical history  1999/2003    Bilateral Rotator Cuff surgery      Social History     Occupational History    Occupation: [employer]     Employer: [employer]   Tobacco Use    Smoking status: Never    Smokeless tobacco: Never   Substance and Sexual Activity    Alcohol use: Yes      Alcohol/week: 3.3 standard drinks of alcohol     Types: 4 Standard drinks or equivalent per week     Comment: socially    Drug use: No    Sexual activity: Not on file      Current Outpatient Medications   Medication Sig Dispense Refill    rosuvastatin (CRESTOR) 20 MG tablet Take 1 tablet by mouth daily. 90 tablet 1    amLODIPine (NORVASC) 5 MG tablet Take 1 tablet by mouth daily. 90 tablet 0    losartan-hydrochlorothiazide (HYZAAR) 100-25 MG per tablet TAKE 1 TABLET BY MOUTH DAILY 90 tablet 0    omeprazole (PrilOSEC) 20 MG capsule Take 1 capsule by mouth in the morning and in the evening. 180 capsule 3    aspirin 81 MG tablet Take 1 tablet by mouth daily. 30 tablet 3    ibuprofen (MOTRIN) 200 MG tablet Take 600 mg by mouth every 6 hours as needed for Pain.       No current facility-administered medications for this visit.      ALLERGIES:   Allergen Reactions    Penicillin G RASH       REVIEW OF SYSTEMS          PHYSICAL EXAM      There were no vitals taken for this visit.   General:  Well-groomed, alert and oriented with normal affect.      Musculoskeletal:  Right shoulder exam   He localizes his pain primarily to the subacromial region.  Skin is intact and perfusion is good and light touch is intact.  He has full active hand wrist and elbow motion.  He has symmetric essentially full shoulder active flexion and abduction and internal rotation.  There is modest discomfort with overhead motion.  Strength is 4+/5 with resisted abduction and external rotation.  Subscapularis strength seems preserved.    X-RAY INTERPRETATION:  X-rays of the shoulder independently reviewed by me today demonstrate narrowing of the acromial humeral interval and changes consistent with chronic rotator cuff tearing     ASSESSMENT/PLAN      IMPRESSION:  Right shoulder rotator cuff tear arthropathy    TREATMENT AND RECOMMENDATIONS:  The diagnosis and treatment options were discussed with him the imaging studies were reviewed with him.  In  the past he is tried therapy which has aggravated his shoulder.  The spectrum of treatment including medication injections and surgical intervention were discussed.  Right now he has full motion and functional strength.  I do not think that a reverse arthroplasty is going to be of benefit for him in my opinion.  I would recommend trying p.r.n. anti-inflammatories, glucosamine, turmeric, and symptomatic treatment.  He may need to modify his activities.  We discussed the option of injections but he wants to hold off on that for now.    Thank you for this referral.       no

## 2024-10-10 NOTE — ED ADULT NURSE NOTE - OBJECTIVE STATEMENT
[Follow-Up] : a follow-up visit [Asthma] : asthma alert and oriented x3 breathing  w ease on room air, complains of headache and pain to left side of chest reports passing out 2x this morning, denies hitting head, breathing w ease on room air, reports fever and generalized body aches for 2 x days. blood sent to lab , IV placed, medicated as ordered, monitoring continues
